# Patient Record
Sex: FEMALE | Race: WHITE | URBAN - METROPOLITAN AREA
[De-identification: names, ages, dates, MRNs, and addresses within clinical notes are randomized per-mention and may not be internally consistent; named-entity substitution may affect disease eponyms.]

---

## 2024-03-21 RX ORDER — ESCITALOPRAM OXALATE 20 MG/1
20 TABLET ORAL DAILY
COMMUNITY
Start: 2024-03-13

## 2024-03-21 RX ORDER — METFORMIN HYDROCHLORIDE 500 MG/1
TABLET, EXTENDED RELEASE ORAL
COMMUNITY
Start: 2023-12-10 | End: 2024-03-25

## 2024-03-21 RX ORDER — DOXYCYCLINE HYCLATE 100 MG
100 TABLET ORAL 2 TIMES DAILY
Status: ON HOLD | COMMUNITY
Start: 2023-12-06 | End: 2024-03-26

## 2024-03-25 ENCOUNTER — ANESTHESIA EVENT (OUTPATIENT)
Dept: SURGERY | Facility: HOSPITAL | Age: 34
End: 2024-03-25
Payer: COMMERCIAL

## 2024-03-26 ENCOUNTER — APPOINTMENT (OUTPATIENT)
Dept: ULTRASOUND IMAGING | Facility: HOSPITAL | Age: 34
End: 2024-03-26
Attending: STUDENT IN AN ORGANIZED HEALTH CARE EDUCATION/TRAINING PROGRAM
Payer: COMMERCIAL

## 2024-03-26 ENCOUNTER — ANESTHESIA (OUTPATIENT)
Dept: SURGERY | Facility: HOSPITAL | Age: 34
End: 2024-03-26
Payer: COMMERCIAL

## 2024-03-26 ENCOUNTER — HOSPITAL ENCOUNTER (OUTPATIENT)
Facility: HOSPITAL | Age: 34
Setting detail: HOSPITAL OUTPATIENT SURGERY
Discharge: HOME | End: 2024-03-26
Attending: STUDENT IN AN ORGANIZED HEALTH CARE EDUCATION/TRAINING PROGRAM | Admitting: STUDENT IN AN ORGANIZED HEALTH CARE EDUCATION/TRAINING PROGRAM
Payer: COMMERCIAL

## 2024-03-26 VITALS
BODY MASS INDEX: 29.25 KG/M2 | TEMPERATURE: 98 F | HEIGHT: 60 IN | OXYGEN SATURATION: 99 % | SYSTOLIC BLOOD PRESSURE: 122 MMHG | RESPIRATION RATE: 16 BRPM | DIASTOLIC BLOOD PRESSURE: 75 MMHG | WEIGHT: 149 LBS | HEART RATE: 64 BPM

## 2024-03-26 DIAGNOSIS — O02.1 MISSED AB (HCC): ICD-10-CM

## 2024-03-26 PROBLEM — O03.9 MISCARRIAGE (HCC): Status: ACTIVE | Noted: 2024-03-26

## 2024-03-26 PROBLEM — O03.9 MISCARRIAGE (HCC): Status: RESOLVED | Noted: 2024-03-26 | Resolved: 2024-03-26

## 2024-03-26 PROCEDURE — 76998 US GUIDE INTRAOP: CPT | Performed by: STUDENT IN AN ORGANIZED HEALTH CARE EDUCATION/TRAINING PROGRAM

## 2024-03-26 PROCEDURE — 10D17ZZ EXTRACTION OF PRODUCTS OF CONCEPTION, RETAINED, VIA NATURAL OR ARTIFICIAL OPENING: ICD-10-PCS | Performed by: STUDENT IN AN ORGANIZED HEALTH CARE EDUCATION/TRAINING PROGRAM

## 2024-03-26 PROCEDURE — 88305 TISSUE EXAM BY PATHOLOGIST: CPT | Performed by: STUDENT IN AN ORGANIZED HEALTH CARE EDUCATION/TRAINING PROGRAM

## 2024-03-26 PROCEDURE — 88262 CHROMOSOME ANALYSIS 15-20: CPT | Performed by: STUDENT IN AN ORGANIZED HEALTH CARE EDUCATION/TRAINING PROGRAM

## 2024-03-26 PROCEDURE — 88291 CYTO/MOLECULAR REPORT: CPT | Performed by: STUDENT IN AN ORGANIZED HEALTH CARE EDUCATION/TRAINING PROGRAM

## 2024-03-26 PROCEDURE — 88233 TISSUE CULTURE SKIN/BIOPSY: CPT | Performed by: STUDENT IN AN ORGANIZED HEALTH CARE EDUCATION/TRAINING PROGRAM

## 2024-03-26 RX ORDER — HYDROMORPHONE HYDROCHLORIDE 1 MG/ML
0.2 INJECTION, SOLUTION INTRAMUSCULAR; INTRAVENOUS; SUBCUTANEOUS EVERY 5 MIN PRN
Status: DISCONTINUED | OUTPATIENT
Start: 2024-03-26 | End: 2024-03-26

## 2024-03-26 RX ORDER — ACETAMINOPHEN 500 MG
1000 TABLET ORAL ONCE
Status: DISCONTINUED | OUTPATIENT
Start: 2024-03-26 | End: 2024-03-26

## 2024-03-26 RX ORDER — KETOROLAC TROMETHAMINE 30 MG/ML
INJECTION, SOLUTION INTRAMUSCULAR; INTRAVENOUS AS NEEDED
Status: DISCONTINUED | OUTPATIENT
Start: 2024-03-26 | End: 2024-03-26 | Stop reason: SURG

## 2024-03-26 RX ORDER — HYDROMORPHONE HYDROCHLORIDE 1 MG/ML
0.4 INJECTION, SOLUTION INTRAMUSCULAR; INTRAVENOUS; SUBCUTANEOUS EVERY 5 MIN PRN
Status: DISCONTINUED | OUTPATIENT
Start: 2024-03-26 | End: 2024-03-26

## 2024-03-26 RX ORDER — SODIUM CHLORIDE, SODIUM LACTATE, POTASSIUM CHLORIDE, CALCIUM CHLORIDE 600; 310; 30; 20 MG/100ML; MG/100ML; MG/100ML; MG/100ML
INJECTION, SOLUTION INTRAVENOUS CONTINUOUS
Status: DISCONTINUED | OUTPATIENT
Start: 2024-03-26 | End: 2024-03-26

## 2024-03-26 RX ORDER — ACETAMINOPHEN 500 MG
1000 TABLET ORAL ONCE AS NEEDED
Status: DISCONTINUED | OUTPATIENT
Start: 2024-03-26 | End: 2024-03-26

## 2024-03-26 RX ORDER — HYDROMORPHONE HYDROCHLORIDE 1 MG/ML
0.6 INJECTION, SOLUTION INTRAMUSCULAR; INTRAVENOUS; SUBCUTANEOUS EVERY 5 MIN PRN
Status: DISCONTINUED | OUTPATIENT
Start: 2024-03-26 | End: 2024-03-26

## 2024-03-26 RX ORDER — SCOLOPAMINE TRANSDERMAL SYSTEM 1 MG/1
1 PATCH, EXTENDED RELEASE TRANSDERMAL ONCE
Status: DISCONTINUED | OUTPATIENT
Start: 2024-03-26 | End: 2024-03-26

## 2024-03-26 RX ORDER — ONDANSETRON 2 MG/ML
4 INJECTION INTRAMUSCULAR; INTRAVENOUS EVERY 6 HOURS PRN
Status: DISCONTINUED | OUTPATIENT
Start: 2024-03-26 | End: 2024-03-26

## 2024-03-26 RX ORDER — LIDOCAINE HYDROCHLORIDE 10 MG/ML
INJECTION, SOLUTION EPIDURAL; INFILTRATION; INTRACAUDAL; PERINEURAL AS NEEDED
Status: DISCONTINUED | OUTPATIENT
Start: 2024-03-26 | End: 2024-03-26 | Stop reason: SURG

## 2024-03-26 RX ORDER — HYDROCODONE BITARTRATE AND ACETAMINOPHEN 5; 325 MG/1; MG/1
2 TABLET ORAL ONCE AS NEEDED
Status: DISCONTINUED | OUTPATIENT
Start: 2024-03-26 | End: 2024-03-26

## 2024-03-26 RX ORDER — HYDROCODONE BITARTRATE AND ACETAMINOPHEN 5; 325 MG/1; MG/1
1 TABLET ORAL ONCE AS NEEDED
Status: DISCONTINUED | OUTPATIENT
Start: 2024-03-26 | End: 2024-03-26

## 2024-03-26 RX ORDER — PROCHLORPERAZINE EDISYLATE 5 MG/ML
5 INJECTION INTRAMUSCULAR; INTRAVENOUS EVERY 8 HOURS PRN
Status: DISCONTINUED | OUTPATIENT
Start: 2024-03-26 | End: 2024-03-26

## 2024-03-26 RX ORDER — ONDANSETRON 2 MG/ML
INJECTION INTRAMUSCULAR; INTRAVENOUS AS NEEDED
Status: DISCONTINUED | OUTPATIENT
Start: 2024-03-26 | End: 2024-03-26 | Stop reason: SURG

## 2024-03-26 RX ORDER — NALOXONE HYDROCHLORIDE 0.4 MG/ML
0.08 INJECTION, SOLUTION INTRAMUSCULAR; INTRAVENOUS; SUBCUTANEOUS AS NEEDED
Status: DISCONTINUED | OUTPATIENT
Start: 2024-03-26 | End: 2024-03-26

## 2024-03-26 RX ADMIN — SODIUM CHLORIDE, SODIUM LACTATE, POTASSIUM CHLORIDE, CALCIUM CHLORIDE: 600; 310; 30; 20 INJECTION, SOLUTION INTRAVENOUS at 10:00:00

## 2024-03-26 RX ADMIN — SODIUM CHLORIDE, SODIUM LACTATE, POTASSIUM CHLORIDE, CALCIUM CHLORIDE: 600; 310; 30; 20 INJECTION, SOLUTION INTRAVENOUS at 09:25:00

## 2024-03-26 RX ADMIN — KETOROLAC TROMETHAMINE 30 MG: 30 INJECTION, SOLUTION INTRAMUSCULAR; INTRAVENOUS at 09:47:00

## 2024-03-26 RX ADMIN — LIDOCAINE HYDROCHLORIDE 5 ML: 10 INJECTION, SOLUTION EPIDURAL; INFILTRATION; INTRACAUDAL; PERINEURAL at 09:29:00

## 2024-03-26 RX ADMIN — ONDANSETRON 4 MG: 2 INJECTION INTRAMUSCULAR; INTRAVENOUS at 09:44:00

## 2024-03-26 NOTE — ANESTHESIA POSTPROCEDURE EVALUATION
[facility]    Lilli Baltazar Patient Status:  Hospital Outpatient Surgery   Age/Gender 33 year old female MRN [id number]   Location [facility] SURGERY Attending Barbara Pan MD   Hosp Day # 0 PCP No primary care provider on file.       Anesthesia Post-op Note    SUCTION DILATION AND CURETTAGE WITH ULTRASOUND GUIDANCE AND ULTRASOUND TECHNOLOGIST    Procedure Summary       Date: 03/26/24 Room / Location:  MAIN OR 02 / [facility] MAIN OR    Anesthesia Start: 0925 Anesthesia Stop: 1000    Procedure: SUCTION DILATION AND CURETTAGE WITH ULTRASOUND GUIDANCE AND ULTRASOUND TECHNOLOGIST (Uterus) Diagnosis: (MISSED AB)    Surgeons: Barbara Pan MD Anesthesiologist: Lily Bland MD    Anesthesia Type: MAC ASA Status: 2            Anesthesia Type: MAC    Vitals Value Taken Time   /75 03/26/24 1058   Temp 98 °F (36.7 °C) 03/26/24 0957   Pulse 66 03/26/24 1102   Resp 16 03/26/24 0957   SpO2 99 % 03/26/24 1102   Vitals shown include unfiled device data.    Patient Location: Same Day Surgery    Anesthesia Type: MAC    Airway Patency: patent    Postop Pain Control: adequate    Mental Status: preanesthetic baseline    Nausea/Vomiting: none    Cardiopulmonary/Hydration status: stable euvolemic    Complications: no apparent anesthesia related complications    Postop vital signs: stable    Dental Exam: Unchanged from Preop    Patient to be discharged from PACU when criteria met.

## 2024-03-26 NOTE — OPERATIVE REPORT
[facility]  Operative Report    Lilli Baltazar Patient Status:  [facility] Outpatient Surgery    1990 MRN [id number]   Location [facility] SURGERY Attending Barbara Pan MD    Day # 0 PCP No primary care provider on file.       Procedure: suction dilation and curettage    Date of procedure: 24    Pre op diagnosis: early pregnancy loss    Post op diagnosis: early pregnancy loss    Surgeon: Barbara Pan MD    Findings:   1. Normal external genitalia  2. Cervix dilated to 8 mm    Anesthesia: MAC    EBL: 15 mL    Antibiotics: Doxycycline Hyclate 200mg IV     Urine output: 250 mL clear urine    Specimen: products of conception    Procedure details:  The patient was transferred to the operating room where anesthesia was initiated.     The patient was prepped and draped in the normal sterile fashion in dorsal lithotomy position. A catheter was used to evacuate the bladder of 250 mL of clear urine. A sterile speculum was placed in the vagina, and the cervix was visualized. A single tooth tenaculum was placed on the anterior lip of the cervix. Bedside ultrasound showed an intrauterine sac.     Hegar dilators were used to dilate to 8 mm under visualization from the ultrasound. A suction curette was advanced and rotated in a circular fashion, obtaining a moderate amount of tissue to be sent to pathology. The curette was passed two times.    Sharp curettage was then performed with additional tissue to be sent to pathology. The suction curette was then passed two final times to clear the uterus of any remaining products of conception. The lining appeared thin on ultrasound with low suspicion for retained tissue.    The tenaculum was removed from the anterior lip of the cervix. Pressure was held with a sponge stick until hemostasis was noted. The speculum was removed.    All sponge, lap and instrument counts were correct x2. The patient tolerated the procedure well and was taken to the recovery  room in stable condition.     Barbara Pan MD

## 2024-03-26 NOTE — ANESTHESIA PREPROCEDURE EVALUATION
PRE-OP EVALUATION    Patient Name: Lilli Baltazar    Admit Diagnosis: MISSED AB    Pre-op Diagnosis: MISSED AB    SUCTION DILATION AND CURETTAGE WITH ULTRASOUND GUIDANCE AND ULTRASOUND TECHNOLOGIST    Anesthesia Procedure: SUCTION DILATION AND CURETTAGE WITH ULTRASOUND GUIDANCE AND ULTRASOUND TECHNOLOGIST    Surgeon(s) and Role:     * aBrbara Pan MD - Primary    Pre-op vitals reviewed.        Body mass index is 27.93 kg/m².    Current medications reviewed.  Hospital Medications:  No current facility-administered medications on file as of .       Outpatient Medications:     No medications prior to admission.       Allergies: Penicillins      Anesthesia Evaluation    Patient summary reviewed.    Anesthetic Complications  (+) history of anesthetic complications  History of: PONV       GI/Hepatic/Renal    Negative GI/hepatic/renal ROS.                             Cardiovascular    Negative cardiovascular ROS.    Exercise tolerance: good     MET: >4    (+) obesity                         (-) angina     (-) SALAZAR         Endo/Other  Comment: PCOS  Negative endo/other ROS.  (-) diabetes     (-) hypothyroidism  (-) hyperthyroidism                     Pulmonary    Negative pulmonary ROS.                       Neuro/Psych        (+) anxiety                              Past Surgical History:   Procedure Laterality Date    HC SURGERY CATHETER EPS DX/ABLATION OTHER THAN 3D       UTERINE POLYP REMOVED      OTHER SURGICAL HISTORY      ELECTIVE  -MIDDLE SCHOOL    OTHER SURGICAL HISTORY      WISDOM TEETH  SURGERY     Social History     Tobacco Use    Smoking status: Never    Smokeless tobacco: Never   Vaping Use    Vaping Use: Former   Substance and Sexual Activity    Alcohol use: Not Currently    Drug use: Never     History   Drug Use Unknown     Available pre-op labs reviewed.               Airway      Mallampati: II  Mouth opening: >3 FB  TM distance: > 6 cm  Neck ROM: full Cardiovascular    Cardiovascular  exam normal.         Dental             Pulmonary    Pulmonary exam normal.                 Other findings              ASA: 2   Plan: MAC  NPO status verified and Patient does not meet NPO guidelines.  Patient has not taken beta blockers in last 24 hours.  Post-procedure pain management plan discussed with surgeon and patient.    Comment:     A detailed discussion about the anesthetic plan was held with Lilli Baltazar in the preoperative area. Benefits and risks of MAC anesthesia were discussed, including intraoperative awareness/recall, PONV, reasonable expectations of post-operative pain/discomfort, aspiration, conversion to general anesthesia, dental injury, pressure/nerve injuries from positioning, and other serious but rare complications (life-threatening cardiopulmonary events). All questions were answered appropriately and patient demonstrated understanding of realistic expectations and risks of undergoing anesthesia. Lilli Baltazar consents to receiving anesthesia and wishes to proceed.     Plan/risks discussed with: patient              Present on Admission:  **None**

## 2024-03-26 NOTE — DISCHARGE INSTRUCTIONS
Suction Curettage (Therapeutic Dilation & Curettage, D&C)   Suction curettage is a procedure to remove the lining and contents of the womb (uterus). It may be done to stop bleeding, control pain, and prevent infection after a miscarriage, , or childbirth. It may also be done to remove a molar pregnancy. This is when tumors grow in the womb instead of or in addition to a fetus.   After the procedure, you should be able to return to your normal routine in 1 or 2 days. But you may have some cramping and light bleeding. This is normal. These problems should go away within 5 to 7 days. You can expect to have your next period within 4 to 6 weeks.   Home care  If you have pain or cramping, use pain medicine as directed.  If you have light bleeding, use pads instead of tampons. Change these as often as needed.  Don't douche, use tampons, or have sex until your healthcare provider says it’s OK.  Take showers instead of baths for 1 to 2 weeks.    Follow-up care  Follow-up with your healthcare provider as directed.  When to seek medical advice  Call your healthcare provider right away if any of these occur:  Fever of 100.4ºF (38ºC) or higher, or as directed by your healthcare provider  Heavy bleeding  Bleeding that lasts longer than 1 week  Pain or cramping worsens instead of getting better  Foul-smelling discharge from the vagina  Passage of anything that resembles tissue from the vagina. If possible, save the tissue and bring it to the healthcare provider.  Weakness, dizziness, or fainting  [employer] last reviewed this educational content on 2022 [employer]. All rights reserved. This information is not intended as a substitute for professional medical care. Always follow your healthcare professional's instructions.

## 2024-03-26 NOTE — H&P
[facility]  Gynecology History & Physical      Lilli Baltazar Patient Status:  Hospital Outpatient Surgery    1990 MRN [id number]   Location [facility] PERIOPERATIVE SERVICE Attending Barbara Pan MD   Hosp Day # 0 PCP No primary care provider on file.       Subjective:     Chief Complaint: miscarriage, bleeding    HPI  Patient is a 33 year old  female here for scheduled suction D&C for early pregnancy loss. Ultrasound on 24 at 7w5d by LMP showed an intrauterine sac measuring 6w5d with a possible small, irregularly shaped yolk sac. A fetal pole was not visualized. Follow up imaging on 3/13/24 at 9w5d by LMP showed the gestational sac measuring 6w6d without a fetal pole, confirming early pregnancy loss. Expectant, medical, and surgical management were discussed. The patient elected surgical management. The patient reports bleeding since 3/22/24. Ultrasound yesterday showed the gestational sac has not passed.     Gynecological History:  Menses: every 28-30 days lasting 4-5 days.   Sexually transmitted diseases: no past history  Contraception: none  Abnormal paps: none  Last pap: normal Date: 2024      Obstetric History:  OB History    Para Term  AB Living   2       1     SAB IAB Ectopic Multiple Live Births     1            # Outcome Date GA Lbr Shekhar/2nd Weight Sex Delivery Anes PTL Lv   2 Current            1 IAB                Meds:  Medications Prior to Admission   Medication Sig Dispense Refill Last Dose    escitalopram 20 MG Oral Tab Take 1 tablet (20 mg total) by mouth daily.   3/25/2024    metFORMIN 500 MG Oral Tab    Past Month       Allergies:  Penicillins    Past Medical History:  Past Medical History:   Diagnosis Date    Anxiety state     Endometrial polyp 10/2023    History of smoking 2007    stopped in  for pregnancy    PCOS (polycystic ovarian syndrome)     PONV (postoperative nausea and vomiting)        Past Surgical History:  Past Surgical History:    Procedure Laterality Date    HYSTEROSCOPY  2023    UTERINE POLYP REMOVED      OTHER SURGICAL HISTORY      ELECTIVE  -MIDDLE SCHOOL    OTHER SURGICAL HISTORY      WISDOM TEETH  SURGERY       Family History:  Family History   Problem Relation Age of Onset    Breast Cancer Maternal Aunt        Social History:  Social History     Socioeconomic History    Marital status:      Spouse name: Not on file    Number of children: Not on file    Years of education: Not on file    Highest education level: Not on file   Occupational History    Not on file   Tobacco Use    Smoking status: Never    Smokeless tobacco: Never   Vaping Use    Vaping Use: Former   Substance and Sexual Activity    Alcohol use: Not Currently    Drug use: Never    Sexual activity: Not on file   Other Topics Concern    Not on file   Social History Narrative    Not on file     Social Determinants of Health     Financial Resource Strain: Not on file   Food Insecurity: Not on file   Transportation Needs: Not on file   Stress: Not on file   Housing Stability: Not on file       Review of Systems  Pertinent items are noted in HPI.     Objective:     /74 (BP Location: Left arm)   Pulse 75   Temp 98.1 °F (36.7 °C) (Temporal)   Resp 16   Ht 5' (1.524 m)   Wt 149 lb (67.6 kg)   LMP 2024   SpO2 99%   Breastfeeding No   BMI 29.10 kg/m²     General:   alert, appears stated age, and cooperative   Abdomen:  Soft, nontender ,nondistended   Extremities: No edema. No calf tenderness.     Labs:            12.4  7 >-----------< 313           38.4      IMAGING: see HPI    Assessment:      Pt is 33 year old  female with an early pregnancy loss who presents for surgical management.    Plan:     Planned Procedure: suction dilation and curettage with ultrasound guidance    Barbara Pan MD  3/26/2024

## 2024-04-05 LAB
CELLS ANALYZED CHRPOC: 20
CELLS COUNTED CHRPOC: 20
CELLS KARYOTYPED CHRPOC: 2
GTG BAND RES ACHIEVED CHRPOC: 450

## 2024-04-09 ENCOUNTER — TELEPHONE (OUTPATIENT)
Dept: OBGYN UNIT | Facility: HOSPITAL | Age: 34
End: 2024-04-09

## 2024-06-24 LAB
ANTIBODY SCREEN OB: NEGATIVE
HEPATITIS B SURFACE ANTIGEN OB: NEGATIVE
HIV RESULT OB: NEGATIVE
RAPID PLASMA REAGIN OB: NONREACTIVE
RH FACTOR OB: POSITIVE

## 2024-10-10 ENCOUNTER — HOSPITAL ENCOUNTER (OUTPATIENT)
Facility: HOSPITAL | Age: 34
Discharge: HOME | End: 2024-10-10
Attending: OBSTETRICS & GYNECOLOGY | Admitting: OBSTETRICS & GYNECOLOGY
Payer: COMMERCIAL

## 2024-10-10 VITALS
SYSTOLIC BLOOD PRESSURE: 123 MMHG | RESPIRATION RATE: 18 BRPM | HEIGHT: 60 IN | WEIGHT: 165 LBS | HEART RATE: 115 BPM | DIASTOLIC BLOOD PRESSURE: 77 MMHG | BODY MASS INDEX: 32.39 KG/M2 | TEMPERATURE: 98 F

## 2024-10-10 PROCEDURE — 99212 OFFICE O/P EST SF 10 MIN: CPT

## 2024-10-10 RX ORDER — CHOLECALCIFEROL (VITAMIN D3) 25 MCG
1 TABLET,CHEWABLE ORAL DAILY
COMMUNITY

## 2024-10-10 RX ORDER — ASPIRIN 81 MG/1
81 TABLET ORAL DAILY
COMMUNITY

## 2024-10-10 NOTE — PROGRESS NOTES
Pt is a 34 year old female admitted to TRG2/TRG2-A.     Chief Complaint   Patient presents with    Decreased Fetal Movement     Pt has not felt regular fetal movement since Monday 10/7/24, only felt 1 small movement today around 10am      Pt is  Unknown intra-uterine pregnancy.  History obtained, consents signed. Oriented to room, staff, and plan of care.

## 2024-10-10 NOTE — PROGRESS NOTES
Discharge instructions and kick counts discussed w/pt, no questions at this time.  Pt left unit in stable condition, ambulatory, and with all personal belongings.

## 2024-10-10 NOTE — NST
Nonstress Test   Patient: Lilli Baltazar    Gestation: 27w0d    NST:       Variability: Moderate           Accelerations: Yes           Decelerations: None            Baseline: 140 BPM           Uterine Irritability: No           Contractions: Not present                                        Acoustic Stimulator: No           Nonstress Test Interpretation: Appropriate for gestational age           Nonstress Test Second Interpretation: Appropriate for gestational age                     Additional Comments

## 2024-10-10 NOTE — DISCHARGE INSTRUCTIONS
Discharge Instructions    Diet: Regular  Activity: Normal activity         General Instructions    Call your OB doctor if: Fluid leaking from your vagina;Uterine contractions increasing in intensity and frequency;Vaginal bleeding;Vaginal or rectal pressure;Temperature greater than 100F;Decrease in fetal movement;Uterine contractions 10 minutes or closer for 1 to 2 hours

## 2024-10-26 LAB — HIV RESULT OB: NEGATIVE

## 2024-10-31 ENCOUNTER — TELEPHONE (OUTPATIENT)
Dept: HEMATOLOGY/ONCOLOGY | Facility: HOSPITAL | Age: 34
End: 2024-10-31

## 2024-10-31 NOTE — TELEPHONE ENCOUNTER
Patient calling to make heme consult for low iron anemia. [facility] to [facility]. Thank you jimbo

## 2024-11-14 ENCOUNTER — TELEPHONE (OUTPATIENT)
Dept: HEMATOLOGY/ONCOLOGY | Facility: HOSPITAL | Age: 34
End: 2024-11-14

## 2024-11-14 ENCOUNTER — APPOINTMENT (OUTPATIENT)
Dept: HEMATOLOGY/ONCOLOGY | Facility: HOSPITAL | Age: 34
End: 2024-11-14
Attending: INTERNAL MEDICINE
Payer: COMMERCIAL

## 2024-11-14 PROBLEM — D50.8 IRON DEFICIENCY ANEMIA SECONDARY TO INADEQUATE DIETARY IRON INTAKE: Status: ACTIVE | Noted: 2024-11-14

## 2024-11-15 ENCOUNTER — TELEPHONE (OUTPATIENT)
Dept: HEMATOLOGY/ONCOLOGY | Facility: HOSPITAL | Age: 34
End: 2024-11-15

## 2024-11-15 NOTE — TELEPHONE ENCOUNTER
----- Message from Jazzmine ESQUIVEL sent at 11/15/2024  8:13 AM CST -----  Hi- can you please call to reschedule her consult and iron infusion from this Monday to the next week on Monday? We are waiting for insurance approval for her iron.    Thanks so much!

## 2024-11-18 ENCOUNTER — APPOINTMENT (OUTPATIENT)
Dept: HEMATOLOGY/ONCOLOGY | Facility: HOSPITAL | Age: 34
End: 2024-11-18
Attending: INTERNAL MEDICINE
Payer: COMMERCIAL

## 2024-11-19 ENCOUNTER — TELEPHONE (OUTPATIENT)
Dept: HEMATOLOGY/ONCOLOGY | Facility: HOSPITAL | Age: 34
End: 2024-11-19

## 2024-11-26 ENCOUNTER — OFFICE VISIT (OUTPATIENT)
Dept: HEMATOLOGY/ONCOLOGY | Facility: HOSPITAL | Age: 34
End: 2024-11-26
Attending: INTERNAL MEDICINE
Payer: COMMERCIAL

## 2024-11-26 VITALS
SYSTOLIC BLOOD PRESSURE: 143 MMHG | TEMPERATURE: 97 F | DIASTOLIC BLOOD PRESSURE: 70 MMHG | OXYGEN SATURATION: 98 % | RESPIRATION RATE: 20 BRPM | BODY MASS INDEX: 35 KG/M2 | WEIGHT: 179.81 LBS | HEART RATE: 108 BPM

## 2024-11-26 VITALS — SYSTOLIC BLOOD PRESSURE: 116 MMHG | DIASTOLIC BLOOD PRESSURE: 77 MMHG | HEART RATE: 96 BPM

## 2024-11-26 DIAGNOSIS — D50.8 IRON DEFICIENCY ANEMIA SECONDARY TO INADEQUATE DIETARY IRON INTAKE: Primary | ICD-10-CM

## 2024-11-26 DIAGNOSIS — O99.013 ANEMIA DURING PREGNANCY IN THIRD TRIMESTER (HCC): ICD-10-CM

## 2024-11-26 PROCEDURE — G2211 COMPLEX E/M VISIT ADD ON: HCPCS | Performed by: INTERNAL MEDICINE

## 2024-11-26 PROCEDURE — 96365 THER/PROPH/DIAG IV INF INIT: CPT

## 2024-11-26 PROCEDURE — 99205 OFFICE O/P NEW HI 60 MIN: CPT | Performed by: INTERNAL MEDICINE

## 2024-11-26 NOTE — PROGRESS NOTES
Hematology/Oncology Initial Consultation Note    Patient Name: Lilli Baltazar  Medical Record Number: [id number]    YOB: 1990   Date of Consultation: 2024   PCP: No primary care provider on file.  Other providers:  Dr aPn    Reason for Consultation:  Lilli Baltazar was seen today for the diagnosis of iron deficiency anemia    History of Present Illness:      33 y/o 33 week pregnant F presenting for iron deficiency anemia in pregnancy.    - noted to be iron deficient back in 2024 with ferritin of 11, hgb 11.9 g/dL  - recent labs on 10/26 with ferritin of 4.8, hgb 9.2g/dL  - having ongoing fatigue  - she is planning for   - she plans to breast feed  - does not feel like her periods before pregnancy were particularly heavy  - no blood in urine or stool  - having a boy      Past Medical History:  Past Medical History:    Anxiety state    Endometrial polyp    History of smoking    stopped in  for pregnancy    PCOS (polycystic ovarian syndrome)    PONV (postoperative nausea and vomiting)       Past Surgical History:   Procedure Laterality Date    Hysteroscopy  2023    UTERINE POLYP REMOVED      Other surgical history      ELECTIVE  -MIDDLE SCHOOL    Other surgical history      WISDOM TEETH  SURGERY       Home Medications:  No outpatient medications have been marked as taking for the 24 encounter (Office Visit) with Daniel Barton MD.     -------  Medications Ordered Prior to Encounter[1]    Allergies:   Allergies[2]    Psychosocial History:  Social History     Social History Narrative    Not on file     Social History     Socioeconomic History    Marital status:    Tobacco Use    Smoking status: Never    Smokeless tobacco: Never   Vaping Use    Vaping status: Former   Substance and Sexual Activity    Alcohol use: Not Currently    Drug use: Never     Social Drivers of Health     Financial Resource Strain: Low Risk  (10/29/2024)    Financial Resource  Strain     Difficulty of Paying Living Expenses: Not very hard     Med Affordability: No   Food Insecurity: No Food Insecurity (10/29/2024)    Food Insecurity     Food Insecurity: Never true   Transportation Needs: No Transportation Needs (10/29/2024)    Transportation Needs     Lack of Transportation: No   Stress: No Stress Concern Present (10/29/2024)    Stress     Feeling of Stress : No   Housing Stability: Low Risk  (10/29/2024)    Housing Stability     Housing Instability: No       Family Medical History:  Family History   Problem Relation Age of Onset    Breast Cancer Maternal Aunt        Review of Systems:  A 10-point ROS was done with pertinent positives and negative per the HPI    Vital Signs:  Height: --  Weight: 81.6 kg (179 lb 12.8 oz) (11/26 1132)  BSA (Calculated - sq m): --  Pulse: 108 (11/26 1132)  BP: 143/70 (11/26 1132)  Temp: 96.5 °F (35.8 °C) (11/26 1132)  Do Not Use - Resp Rate: --  SpO2: 98 % (11/26 1132)    Wt Readings from Last 6 Encounters:   11/26/24 81.6 kg (179 lb 12.8 oz)   10/10/24 74.8 kg (165 lb)   03/26/24 67.6 kg (149 lb)       Physical Examination:  General: Patient is alert and oriented, not in acute distress  Psych: Mood and affect are appropriate  Eyes: EOMI, PERRL  ENT: Oropharynx is clear, no adenopathy  CV: no LE edema  Respiratory: Non-labored respirations  GI/Abd: +pregnant  Neurological: Grossly intact   Skin: no rashes or petechiae    Laboratory:  No results found for: \"WBC\", \"HGB\", \"HCT\", \"MCV\", \"MCH\", \"MCHC\", \"RDW\", \"PLT\"  No results found for: \"GLU\", \"BUN\", \"BUNCREA\", \"CREATSERUM\", \"ANIONGAP\", \"GFR\", \"GFRNAA\", \"GFRAA\", \"CA\", \"OSMOCALC\", \"ALKPHO\", \"AST\", \"ALT\", \"ALKPHOS\", \"BILT\", \"TP\", \"ALB\", \"GLOBULIN\", \"AGRATIO\", \"NA\", \"K\", \"CL\", \"CO2\"  No results found for: \"PTT\", \"PT\", \"INR\"    Impression & Plan:     Iron deficiency anemia in pregnancy  - iron studies consistent with iron deficiency  - schedule 1000mg IV iron dextran today, close monitoring in infusion for  reaction  - repeat CBC, iron, tibc, ferritin in 3 months to reassess iron stores; to draw 2 weeks after her delivery given she plans to breastfeed    Follow up in future for more IV iron if iron deficient in future    Daniel Barton MD  Hematology/Medical Oncology  [facility]           [1]   Current Outpatient Medications on File Prior to Visit   Medication Sig Dispense Refill    prenatal vitamin with DHA 27-0.8-228 MG Oral Cap Take 1 capsule by mouth daily.      aspirin 81 MG Oral Tab EC Take 1 tablet (81 mg total) by mouth daily.      escitalopram 20 MG Oral Tab Take 1 tablet (20 mg total) by mouth daily.       No current facility-administered medications on file prior to visit.   [2]   Allergies  Allergen Reactions    Penicillins OTHER (SEE COMMENTS)      A CHILD

## 2024-11-26 NOTE — PROGRESS NOTES
Education Record    Learner:  Patient and Spouse    Disease / Diagnosis: Iron Deficiency Anemia in pregnancy    Barriers / Limitations:  None   Comments:    Method:  Brief focused and Discussion   Comments:    General Topics:  Medication, Side effects and symptom management, and Plan of care reviewed   Comments:    Outcome:  Shows understanding   Comments:    Infed test dose given without incident. Observed for 15 minutes.  Infed given and tolerated well.  Post vitals stable. Pt to have labs 2 weeks after delivery. Pt states due date is 1/9/25. Labs scheduled for 1/23/25 1:50 pm. Lab orders in. Discharged home in stable condition, no new complaints.

## 2024-11-26 NOTE — PROGRESS NOTES
Education Record    Learner:  Patient and Spouse    Disease / Diagnosis: Iron deficiency in pregnancy    Barriers / Limitations:  None   Comments:    Method:  Discussion   Comments:    General Topics:  Medication, Side effects and symptom management, and Plan of care reviewed   Comments:    Outcome:  Shows understanding   Comments:    Here for new consult. Feeling very fatigued, shortness of breath, and craving ice. Due 1/9/25.

## 2024-12-13 LAB — STREP GP B CULT OB: POSITIVE

## 2024-12-26 ENCOUNTER — TELEPHONE (OUTPATIENT)
Dept: OBGYN UNIT | Facility: HOSPITAL | Age: 34
End: 2024-12-26

## 2024-12-31 ENCOUNTER — HOSPITAL ENCOUNTER (INPATIENT)
Facility: HOSPITAL | Age: 34
LOS: 5 days | Discharge: HOME | End: 2025-01-05
Attending: OBSTETRICS & GYNECOLOGY | Admitting: OBSTETRICS & GYNECOLOGY
Payer: COMMERCIAL

## 2024-12-31 ENCOUNTER — APPOINTMENT (OUTPATIENT)
Dept: OBGYN CLINIC | Facility: HOSPITAL | Age: 34
End: 2024-12-31
Payer: COMMERCIAL

## 2024-12-31 PROBLEM — Z34.90 PREGNANCY (HCC): Status: ACTIVE | Noted: 2024-12-31

## 2024-12-31 LAB
ALBUMIN SERPL-MCNC: 3.9 G/DL (ref 3.2–4.8)
ALBUMIN/GLOB SERPL: 1.6 {RATIO} (ref 1–2)
ALP LIVER SERPL-CCNC: 126 U/L
ALT SERPL-CCNC: 9 U/L
ANION GAP SERPL CALC-SCNC: 10 MMOL/L (ref 0–18)
ANTIBODY SCREEN: NEGATIVE
AST SERPL-CCNC: 17 U/L (ref ?–34)
BASOPHILS # BLD AUTO: 0.03 X10(3) UL (ref 0–0.2)
BASOPHILS NFR BLD AUTO: 0.3 %
BILIRUB SERPL-MCNC: 0.3 MG/DL (ref 0.3–1.2)
BUN BLD-MCNC: 5 MG/DL (ref 9–23)
CALCIUM BLD-MCNC: 9.9 MG/DL (ref 8.7–10.4)
CHLORIDE SERPL-SCNC: 105 MMOL/L (ref 98–112)
CO2 SERPL-SCNC: 23 MMOL/L (ref 21–32)
CREAT BLD-MCNC: 0.58 MG/DL
CREAT UR-SCNC: 21.9 MG/DL
EGFRCR SERPLBLD CKD-EPI 2021: 123 ML/MIN/1.73M2 (ref 60–?)
EOSINOPHIL # BLD AUTO: 0.11 X10(3) UL (ref 0–0.7)
EOSINOPHIL NFR BLD AUTO: 1.1 %
ERYTHROCYTE [DISTWIDTH] IN BLOOD BY AUTOMATED COUNT: 19.9 %
GLOBULIN PLAS-MCNC: 2.4 G/DL (ref 2–3.5)
GLUCOSE BLD-MCNC: 77 MG/DL (ref 70–99)
HCT VFR BLD AUTO: 33.3 %
HGB BLD-MCNC: 11 G/DL
IMM GRANULOCYTES # BLD AUTO: 0.08 X10(3) UL (ref 0–1)
IMM GRANULOCYTES NFR BLD: 0.8 %
LYMPHOCYTES # BLD AUTO: 1.39 X10(3) UL (ref 1–4)
LYMPHOCYTES NFR BLD AUTO: 13.3 %
MCH RBC QN AUTO: 28.2 PG (ref 26–34)
MCHC RBC AUTO-ENTMCNC: 33 G/DL (ref 31–37)
MCV RBC AUTO: 85.4 FL
MONOCYTES # BLD AUTO: 0.73 X10(3) UL (ref 0.1–1)
MONOCYTES NFR BLD AUTO: 7 %
NEUTROPHILS # BLD AUTO: 8.11 X10 (3) UL (ref 1.5–7.7)
NEUTROPHILS # BLD AUTO: 8.11 X10(3) UL (ref 1.5–7.7)
NEUTROPHILS NFR BLD AUTO: 77.5 %
OSMOLALITY SERPL CALC.SUM OF ELEC: 282 MOSM/KG (ref 275–295)
PLATELET # BLD AUTO: 204 10(3)UL (ref 150–450)
POTASSIUM SERPL-SCNC: 3.2 MMOL/L (ref 3.5–5.1)
PROT SERPL-MCNC: 6.3 G/DL (ref 5.7–8.2)
PROT UR-MCNC: <6 MG/DL (ref ?–14)
RBC # BLD AUTO: 3.9 X10(6)UL
RH BLOOD TYPE: POSITIVE
RH BLOOD TYPE: POSITIVE
SODIUM SERPL-SCNC: 138 MMOL/L (ref 136–145)
T PALLIDUM AB SER QL IA: NONREACTIVE
WBC # BLD AUTO: 10.5 X10(3) UL (ref 4–11)

## 2024-12-31 PROCEDURE — 3E0DXGC INTRODUCTION OF OTHER THERAPEUTIC SUBSTANCE INTO MOUTH AND PHARYNX, EXTERNAL APPROACH: ICD-10-PCS | Performed by: OBSTETRICS & GYNECOLOGY

## 2024-12-31 RX ORDER — ACETAMINOPHEN 500 MG
1000 TABLET ORAL EVERY 6 HOURS PRN
Status: DISCONTINUED | OUTPATIENT
Start: 2024-12-31 | End: 2025-01-02

## 2024-12-31 RX ORDER — IBUPROFEN 600 MG/1
600 TABLET, FILM COATED ORAL ONCE AS NEEDED
Status: DISCONTINUED | OUTPATIENT
Start: 2024-12-31 | End: 2025-01-02

## 2024-12-31 RX ORDER — TERBUTALINE SULFATE 1 MG/ML
0.25 INJECTION, SOLUTION SUBCUTANEOUS AS NEEDED
Status: DISCONTINUED | OUTPATIENT
Start: 2024-12-31 | End: 2025-01-02

## 2024-12-31 RX ORDER — DEXTROSE, SODIUM CHLORIDE, SODIUM LACTATE, POTASSIUM CHLORIDE, AND CALCIUM CHLORIDE 5; .6; .31; .03; .02 G/100ML; G/100ML; G/100ML; G/100ML; G/100ML
INJECTION, SOLUTION INTRAVENOUS AS NEEDED
Status: DISCONTINUED | OUTPATIENT
Start: 2024-12-31 | End: 2025-01-02

## 2024-12-31 RX ORDER — CALCIUM CARBONATE 500 MG/1
1000 TABLET, CHEWABLE ORAL EVERY 4 HOURS PRN
Status: DISCONTINUED | OUTPATIENT
Start: 2024-12-31 | End: 2025-01-02

## 2024-12-31 RX ORDER — CITRIC ACID/SODIUM CITRATE 334-500MG
30 SOLUTION, ORAL ORAL AS NEEDED
Status: COMPLETED | OUTPATIENT
Start: 2024-12-31 | End: 2025-01-02

## 2024-12-31 RX ORDER — ACETAMINOPHEN 500 MG
500 TABLET ORAL EVERY 6 HOURS PRN
Status: DISCONTINUED | OUTPATIENT
Start: 2024-12-31 | End: 2025-01-02

## 2024-12-31 RX ORDER — ONDANSETRON 2 MG/ML
4 INJECTION INTRAMUSCULAR; INTRAVENOUS EVERY 6 HOURS PRN
Status: DISCONTINUED | OUTPATIENT
Start: 2024-12-31 | End: 2025-01-02

## 2024-12-31 RX ORDER — SODIUM CHLORIDE, SODIUM LACTATE, POTASSIUM CHLORIDE, CALCIUM CHLORIDE 600; 310; 30; 20 MG/100ML; MG/100ML; MG/100ML; MG/100ML
INJECTION, SOLUTION INTRAVENOUS CONTINUOUS
Status: DISCONTINUED | OUTPATIENT
Start: 2024-12-31 | End: 2025-01-02

## 2024-12-31 RX ORDER — CLINDAMYCIN PHOSPHATE 900 MG/50ML
900 INJECTION, SOLUTION INTRAVENOUS EVERY 8 HOURS
Status: DISCONTINUED | OUTPATIENT
Start: 2025-01-01 | End: 2025-01-02

## 2025-01-01 ENCOUNTER — ANESTHESIA EVENT (OUTPATIENT)
Dept: OBGYN UNIT | Facility: HOSPITAL | Age: 35
End: 2025-01-01
Payer: COMMERCIAL

## 2025-01-01 ENCOUNTER — ANESTHESIA (OUTPATIENT)
Dept: OBGYN UNIT | Facility: HOSPITAL | Age: 35
End: 2025-01-01
Payer: COMMERCIAL

## 2025-01-01 LAB — POTASSIUM SERPL-SCNC: 3.4 MMOL/L (ref 3.5–5.1)

## 2025-01-01 PROCEDURE — 3E033VJ INTRODUCTION OF OTHER HORMONE INTO PERIPHERAL VEIN, PERCUTANEOUS APPROACH: ICD-10-PCS | Performed by: OBSTETRICS & GYNECOLOGY

## 2025-01-01 PROCEDURE — 3E0P7VZ INTRODUCTION OF HORMONE INTO FEMALE REPRODUCTIVE, VIA NATURAL OR ARTIFICIAL OPENING: ICD-10-PCS | Performed by: OBSTETRICS & GYNECOLOGY

## 2025-01-01 RX ORDER — NALBUPHINE HYDROCHLORIDE 10 MG/ML
2.5 INJECTION INTRAMUSCULAR; INTRAVENOUS; SUBCUTANEOUS
Status: DISCONTINUED | OUTPATIENT
Start: 2025-01-01 | End: 2025-01-02

## 2025-01-01 RX ORDER — LIDOCAINE HYDROCHLORIDE 20 MG/ML
5 INJECTION, SOLUTION EPIDURAL; INFILTRATION; INTRACAUDAL; PERINEURAL AS NEEDED
Status: DISCONTINUED | OUTPATIENT
Start: 2025-01-01 | End: 2025-01-02

## 2025-01-01 RX ORDER — POTASSIUM CHLORIDE 1.5 G/1.58G
40 POWDER, FOR SOLUTION ORAL ONCE
Status: COMPLETED | OUTPATIENT
Start: 2025-01-01 | End: 2025-01-01

## 2025-01-01 RX ORDER — SODIUM CHLORIDE 9 MG/ML
10 INJECTION, SOLUTION INTRAMUSCULAR; INTRAVENOUS; SUBCUTANEOUS AS NEEDED
Status: DISCONTINUED | OUTPATIENT
Start: 2025-01-01 | End: 2025-01-02

## 2025-01-01 RX ORDER — BUPIVACAINE HYDROCHLORIDE 2.5 MG/ML
30 INJECTION, SOLUTION EPIDURAL; INFILTRATION; INTRACAUDAL AS NEEDED
Status: DISCONTINUED | OUTPATIENT
Start: 2025-01-01 | End: 2025-01-02

## 2025-01-01 RX ORDER — LIDOCAINE HYDROCHLORIDE AND EPINEPHRINE 15; 5 MG/ML; UG/ML
5 INJECTION, SOLUTION EPIDURAL AS NEEDED
Status: DISCONTINUED | OUTPATIENT
Start: 2025-01-01 | End: 2025-01-02

## 2025-01-01 RX ORDER — SODIUM CHLORIDE 9 MG/ML
INJECTION, SOLUTION INTRAMUSCULAR; INTRAVENOUS; SUBCUTANEOUS
Status: DISCONTINUED
Start: 2025-01-01 | End: 2025-01-02 | Stop reason: WASHOUT

## 2025-01-01 RX ORDER — BUPIVACAINE HCL/0.9 % NACL/PF 0.25 %
5 PLASTIC BAG, INJECTION (ML) EPIDURAL AS NEEDED
Status: DISCONTINUED | OUTPATIENT
Start: 2025-01-01 | End: 2025-01-02

## 2025-01-01 RX ORDER — POTASSIUM CHLORIDE 1500 MG/1
40 TABLET, EXTENDED RELEASE ORAL ONCE
Status: COMPLETED | OUTPATIENT
Start: 2025-01-01 | End: 2025-01-01

## 2025-01-01 RX ORDER — LIDOCAINE HYDROCHLORIDE AND EPINEPHRINE 15; 5 MG/ML; UG/ML
INJECTION, SOLUTION EPIDURAL AS NEEDED
Status: DISCONTINUED | OUTPATIENT
Start: 2025-01-01 | End: 2025-01-02 | Stop reason: SURG

## 2025-01-01 RX ORDER — BUPIVACAINE HCL/0.9 % NACL/PF 0.25 %
PLASTIC BAG, INJECTION (ML) EPIDURAL
Status: DISCONTINUED
Start: 2025-01-01 | End: 2025-01-02 | Stop reason: WASHOUT

## 2025-01-01 RX ADMIN — LIDOCAINE HYDROCHLORIDE AND EPINEPHRINE 3 ML: 15; 5 INJECTION, SOLUTION EPIDURAL at 14:22:00

## 2025-01-01 NOTE — ANESTHESIA PROCEDURE NOTES
Labor Analgesia    Date/Time: 1/1/2025 2:06 PM    Performed by: Shorty Arriola DO  Authorized by: Shorty Arriola DO      General Information and Staff    Start Time:  1/1/2025 2:06 PM  End Time:  1/1/2025 2:22 PM  Anesthesiologist:  Shorty Arriola DO  Performed by:  Anesthesiologist  Patient Location:  OB  Site Identification: surface landmarks    Reason for Block: labor epidural    Preanesthetic Checklist: patient identified, IV checked, risks and benefits discussed, monitors and equipment checked, pre-op evaluation, timeout performed, IV bolus, anesthesia consent and sterile technique used      Procedure Details    Patient Position:  Sitting  Prep: ChloraPrep    Monitoring:  Heart rate and continuous pulse ox  Approach:  Midline    Epidural Needle    Injection Technique:  JIGAR air  Needle Type:  Tuohy  Needle Gauge:  17 G  Needle Length:  3.375 in  Needle Insertion Depth:  5.5  Location:  L3-4    Spinal Needle      Catheter    Catheter Type:  End hole  Catheter Size:  19 G  Catheter at Skin Depth:  10.5  Test Dose:  Negative    Assessment    Sensory Level:  T10    Additional Comments         Sterile technique  Chloraprep skin prep  1% Lidocaine skin infiltration  Negative for CSF, heme, and paresthesias.    3mL 1.5% Lidocaine with EPI test dose.  Test Dose negative for CV/CNS effects  100 mcg fentanyl given via epidural.  5mL epidural bolus of 0.125% Bupivicaine with 2mcg/mL fentanyl followed by 12mL/hr epidural infusion.    No apparent anesthetic complications.

## 2025-01-01 NOTE — ANESTHESIA PREPROCEDURE EVALUATION
PRE-OP EVALUATION    Patient Name: Lilli Baltazar    Admit Diagnosis: Pregnancy (HCC) [Z34.90]    Pre-op Diagnosis: * No pre-op diagnosis entered *        Anesthesia Procedure: LABOR ANALGESIA    * No surgeons found in log *    Pre-op vitals reviewed.  Temp: 98.3 °F (36.8 °C)  Pulse: 117  Resp: 16  BP: 133/80  SpO2: 98 %  Body mass index is 34.96 kg/m².    Current medications reviewed.  Hospital Medications:   [COMPLETED] misoprostol (CYTOTEC) partial tablets 25 mcg  25 mcg Oral Once    [COMPLETED] misoprostol (CYTOTEC) partial tablets 25 mcg  25 mcg Vaginal Once    [COMPLETED] potassium chloride (Klor-Con M20) tab 40 mEq  40 mEq Oral Once    [COMPLETED] potassium chloride (Klor-Con) 20 MEQ oral powder 40 mEq  40 mEq Oral Once    lactated ringers IV bolus 1,000 mL  1,000 mL Intravenous Once    fentaNYL-bupivacaine 2 mcg/mL-0.125% in sodium chloride 0.9% 100 mL EPIDURAL infusion premix  12 mL/hr Epidural Continuous    [COMPLETED] fentaNYL (Sublimaze) 50 mcg/mL injection 100 mcg  100 mcg Epidural Once    lidocaine 1.5%-EPINEPHrine 1:200,000 (Xylocaine-Epinephrine) injection  5 mL Injection PRN    bupivacaine PF (Marcaine) 0.25% injection  30 mL Injection PRN    lidocaine PF (Xylocaine-MPF) 2% injection  5 mL Injection PRN    sodium chloride 0.9% PF injection 10 mL  10 mL Injection PRN    ePHEDrine (PF) 25 MG/5 ML injection 5 mg  5 mg Intravenous PRN    nalbuphine (Nubain) 10 mg/mL injection 2.5 mg  2.5 mg Intravenous Q15 Min PRN    sodium chloride 0.9% PF 0.9% injection        fentaNYL-bupivacaine in sodium chloride 0.9% 2 mcg/mL-0.125% EPIDURAL infusion premix        fentaNYL (Sublimaze) 50 mcg/mL injection        ePHEDrine (PF) 25 MG/5 ML injection        lactated ringers infusion   Intravenous Continuous    dextrose in lactated ringers 5% infusion   Intravenous PRN    lactated ringers IV bolus 500 mL  500 mL Intravenous PRN    acetaminophen (Tylenol Extra Strength) tab 500 mg  500 mg Oral Q6H PRN    acetaminophen  (Tylenol Extra Strength) tab 1,000 mg  1,000 mg Oral Q6H PRN    ibuprofen (Motrin) tab 600 mg  600 mg Oral Once PRN    ondansetron (Zofran) 4 MG/2ML injection 4 mg  4 mg Intravenous Q6H PRN    oxyTOCIN in sodium chloride 0.9% (Pitocin) 30 Units/500mL infusion premix  62.5-900 erna-units/min Intravenous Continuous    terbutaline (Brethine) 1 MG/ML injection 0.25 mg  0.25 mg Subcutaneous PRN    sodium citrate-citric acid (Bicitra) 500-334 MG/5ML oral solution 30 mL  30 mL Oral PRN    calcium carbonate (Tums) chewable tab 1,000 mg  1,000 mg Oral Q4H PRN    [COMPLETED] misoprostol (CYTOTEC) partial tablets 25 mcg  25 mcg Oral Q2H    clindamycin phosphate in NaCl 0.9% (Cleocin) 900 mg/50mL IVPB premix 900 mg  900 mg Intravenous Q8H    oxyTOCIN in sodium chloride 0.9% (Pitocin) 30 Units/500mL infusion premix  0.5-20 erna-units/min Intravenous Continuous       Outpatient Medications:   Prescriptions Prior to Admission[1]    Allergies: Penicillins      Anesthesia Evaluation    Patient summary reviewed.    Anesthetic Complications  (+) history of anesthetic complications  History of: PONV       GI/Hepatic/Renal                                 Cardiovascular  Comment: Patient denies hx of chest pain/tightness, MI, or cardiac disease.    Negative cardiovascular ROS.    Exercise tolerance: good     MET: >4    (+) obesity  (+) hypertension                       (-) angina     (-) SALAZAR         Endo/Other                                  Pulmonary      (-) asthma         (-) shortness of breath     (-) sleep apnea       Neuro/Psych                              No history of anticoagulant use or bleeding diatheses.          Past Surgical History:   Procedure Laterality Date    Hysteroscopy  2023    UTERINE POLYP REMOVED      Other surgical history      ELECTIVE  -[employer]    Other surgical history      WISDOM TEETH  SURGERY     Social History     Socioeconomic History    Marital status:    Tobacco Use     Smoking status: Never    Smokeless tobacco: Never   Vaping Use    Vaping status: Former   Substance and Sexual Activity    Alcohol use: Not Currently    Drug use: Never     History   Drug Use Unknown     Available pre-op labs reviewed.  Lab Results   Component Value Date    WBC 10.5 12/31/2024    RBC 3.90 12/31/2024    HGB 11.0 (L) 12/31/2024    HCT 33.3 (L) 12/31/2024    MCV 85.4 12/31/2024    MCH 28.2 12/31/2024    MCHC 33.0 12/31/2024    RDW 19.9 12/31/2024    .0 12/31/2024     Lab Results   Component Value Date     12/31/2024    K 3.4 (L) 01/01/2025     12/31/2024    CO2 23.0 12/31/2024    BUN 5 (L) 12/31/2024    CREATSERUM 0.58 12/31/2024    GLU 77 12/31/2024    CA 9.9 12/31/2024            Airway      Mallampati: II  Mouth opening: >3 FB  TM distance: 4 - 6 cm  Neck ROM: full Cardiovascular    Cardiovascular exam normal.  Rhythm: regular  Rate: normal     Dental    Dentition appears grossly intact         Pulmonary    Pulmonary exam normal.  Breath sounds clear to auscultation bilaterally.               Other findings              ASA: 2   Plan: epidural  NPO status verified and     Post-procedure pain management plan discussed with surgeon and patient.      Plan/risks discussed with: patient                Present on Admission:  **None**             [1]   Medications Prior to Admission   Medication Sig Dispense Refill Last Dose/Taking    prenatal vitamin with DHA 27-0.8-228 MG Oral Cap Take 1 capsule by mouth daily.   12/31/2024 at  8:00 AM    aspirin 81 MG Oral Tab EC Take 1 tablet (81 mg total) by mouth daily.   12/31/2024 at  8:00 AM    escitalopram 20 MG Oral Tab Take 1 tablet (20 mg total) by mouth daily.   12/31/2024 at  6:00 PM

## 2025-01-01 NOTE — PROGRESS NOTES
Pt is a 34 year old female admitted to 113/[facility]-A.     Chief Complaint   Patient presents with    Scheduled Induction      schedule IOL for gHTN. Endorses good fetal movement, denies contractions, denies leakage of fluid and vaginal bleeding.       Pt is  38w5d intra-uterine pregnancy.  History obtained, consents signed. Oriented to room, staff, and plan of care.     EFM tested and applied. Abdomen soft and non-tender.

## 2025-01-01 NOTE — PLAN OF CARE
Problem: BIRTH - VAGINAL/ SECTION  Goal: Fetal and maternal status remain reassuring during the birth process  Description: INTERVENTIONS:  - Monitor vital signs  - Monitor fetal heart rate  - Monitor uterine activity  - Monitor labor progression (vaginal delivery)  - DVT prophylaxis (C/S delivery)  - Surgical antibiotic prophylaxis (C/S delivery)  Outcome: Progressing     Problem: PAIN - ADULT  Goal: Verbalizes/displays adequate comfort level or patient's stated pain goal  Description: INTERVENTIONS:  - Encourage pt to monitor pain and request assistance  - Assess pain using appropriate pain scale  - Administer analgesics based on type and severity of pain and evaluate response  - Implement non-pharmacological measures as appropriate and evaluate response  - Consider cultural and social influences on pain and pain management  - Manage/alleviate anxiety  - Utilize distraction and/or relaxation techniques  - Monitor for opioid side effects  - Notify MD/LIP if interventions unsuccessful or patient reports new pain  - Anticipate increased pain with activity and pre-medicate as appropriate  Outcome: Progressing     Problem: ANXIETY  Goal: Will report anxiety at manageable levels  Description: INTERVENTIONS:  - Administer medication as ordered  - Teach and rehearse alternative coping skills  - Provide emotional support with 1:1 interaction with staff  Outcome: Progressing     Problem: Patient/Family Goals  Goal: Patient/Family Long Term Goal  Description: Patient's Long Term Goal: Uncomplicated vaginal delivery    Interventions:  - See additional Care Plan goals for specific interventions  Outcome: Progressing  Goal: Patient/Family Short Term Goal  Description: Patient's Short Term Goal: Maternal/fetal wellbeing, blood pressures under control    Interventions:   - See additional Care Plan goals for specific interventions  Outcome: Progressing

## 2025-01-02 PROCEDURE — 10H07YZ INSERTION OF OTHER DEVICE INTO PRODUCTS OF CONCEPTION, VIA NATURAL OR ARTIFICIAL OPENING: ICD-10-PCS | Performed by: OBSTETRICS & GYNECOLOGY

## 2025-01-02 PROCEDURE — 76942 ECHO GUIDE FOR BIOPSY: CPT | Performed by: ANESTHESIOLOGY

## 2025-01-02 PROCEDURE — 59514 CESAREAN DELIVERY ONLY: CPT | Performed by: OBSTETRICS & GYNECOLOGY

## 2025-01-02 RX ORDER — KETOROLAC TROMETHAMINE 30 MG/ML
30 INJECTION, SOLUTION INTRAMUSCULAR; INTRAVENOUS EVERY 6 HOURS
Status: COMPLETED | OUTPATIENT
Start: 2025-01-02 | End: 2025-01-03

## 2025-01-02 RX ORDER — METOCLOPRAMIDE HYDROCHLORIDE 5 MG/ML
INJECTION INTRAMUSCULAR; INTRAVENOUS AS NEEDED
Status: DISCONTINUED | OUTPATIENT
Start: 2025-01-02 | End: 2025-01-02 | Stop reason: SURG

## 2025-01-02 RX ORDER — CITRIC ACID/SODIUM CITRATE 334-500MG
SOLUTION, ORAL ORAL
Status: COMPLETED
Start: 2025-01-02 | End: 2025-01-02

## 2025-01-02 RX ORDER — KETOROLAC TROMETHAMINE 30 MG/ML
INJECTION, SOLUTION INTRAMUSCULAR; INTRAVENOUS AS NEEDED
Status: DISCONTINUED | OUTPATIENT
Start: 2025-01-02 | End: 2025-01-02 | Stop reason: SURG

## 2025-01-02 RX ORDER — DIPHENHYDRAMINE HCL 25 MG
25 CAPSULE ORAL EVERY 4 HOURS PRN
Status: DISCONTINUED | OUTPATIENT
Start: 2025-01-02 | End: 2025-01-05

## 2025-01-02 RX ORDER — ACETAMINOPHEN 500 MG
1000 TABLET ORAL EVERY 6 HOURS
Status: DISCONTINUED | OUTPATIENT
Start: 2025-01-02 | End: 2025-01-05

## 2025-01-02 RX ORDER — GABAPENTIN 300 MG/1
300 CAPSULE ORAL EVERY 8 HOURS SCHEDULED
Status: DISCONTINUED | OUTPATIENT
Start: 2025-01-02 | End: 2025-01-05

## 2025-01-02 RX ORDER — TRANEXAMIC ACID 10 MG/ML
INJECTION, SOLUTION INTRAVENOUS
Status: DISCONTINUED
Start: 2025-01-02 | End: 2025-01-02 | Stop reason: WASHOUT

## 2025-01-02 RX ORDER — CHOLECALCIFEROL (VITAMIN D3) 25 MCG
1 TABLET,CHEWABLE ORAL DAILY
Status: DISCONTINUED | OUTPATIENT
Start: 2025-01-02 | End: 2025-01-05

## 2025-01-02 RX ORDER — BUPIVACAINE HYDROCHLORIDE 2.5 MG/ML
INJECTION, SOLUTION EPIDURAL; INFILTRATION; INTRACAUDAL AS NEEDED
Status: DISCONTINUED | OUTPATIENT
Start: 2025-01-02 | End: 2025-01-02 | Stop reason: SURG

## 2025-01-02 RX ORDER — ONDANSETRON 2 MG/ML
4 INJECTION INTRAMUSCULAR; INTRAVENOUS EVERY 6 HOURS PRN
Status: DISCONTINUED | OUTPATIENT
Start: 2025-01-02 | End: 2025-01-05

## 2025-01-02 RX ORDER — ROPIVACAINE HYDROCHLORIDE 5 MG/ML
INJECTION, SOLUTION EPIDURAL; INFILTRATION; PERINEURAL AS NEEDED
Status: DISCONTINUED | OUTPATIENT
Start: 2025-01-02 | End: 2025-01-02 | Stop reason: SURG

## 2025-01-02 RX ORDER — MORPHINE SULFATE 0.5 MG/ML
INJECTION, SOLUTION EPIDURAL; INTRATHECAL; INTRAVENOUS AS NEEDED
Status: DISCONTINUED | OUTPATIENT
Start: 2025-01-02 | End: 2025-01-02 | Stop reason: SURG

## 2025-01-02 RX ORDER — DIPHENHYDRAMINE HYDROCHLORIDE 50 MG/ML
12.5 INJECTION INTRAMUSCULAR; INTRAVENOUS EVERY 4 HOURS PRN
Status: DISCONTINUED | OUTPATIENT
Start: 2025-01-02 | End: 2025-01-05

## 2025-01-02 RX ORDER — BISACODYL 10 MG
10 SUPPOSITORY, RECTAL RECTAL ONCE AS NEEDED
Status: DISCONTINUED | OUTPATIENT
Start: 2025-01-02 | End: 2025-01-05

## 2025-01-02 RX ORDER — SODIUM CHLORIDE, SODIUM LACTATE, POTASSIUM CHLORIDE, CALCIUM CHLORIDE 600; 310; 30; 20 MG/100ML; MG/100ML; MG/100ML; MG/100ML
INJECTION, SOLUTION INTRAVENOUS CONTINUOUS PRN
Status: DISCONTINUED | OUTPATIENT
Start: 2025-01-02 | End: 2025-01-02 | Stop reason: SURG

## 2025-01-02 RX ORDER — DEXTROSE, SODIUM CHLORIDE, SODIUM LACTATE, POTASSIUM CHLORIDE, AND CALCIUM CHLORIDE 5; .6; .31; .03; .02 G/100ML; G/100ML; G/100ML; G/100ML; G/100ML
INJECTION, SOLUTION INTRAVENOUS CONTINUOUS PRN
Status: DISCONTINUED | OUTPATIENT
Start: 2025-01-02 | End: 2025-01-05

## 2025-01-02 RX ORDER — SIMETHICONE 80 MG
80 TABLET,CHEWABLE ORAL 3 TIMES DAILY PRN
Status: DISCONTINUED | OUTPATIENT
Start: 2025-01-02 | End: 2025-01-05

## 2025-01-02 RX ORDER — ACETAMINOPHEN 500 MG
1000 TABLET ORAL ONCE
Status: DISCONTINUED | OUTPATIENT
Start: 2025-01-02 | End: 2025-01-02

## 2025-01-02 RX ORDER — LIDOCAINE HCL/EPINEPHRINE/PF 2%-1:200K
VIAL (ML) INJECTION AS NEEDED
Status: DISCONTINUED | OUTPATIENT
Start: 2025-01-02 | End: 2025-01-02 | Stop reason: SURG

## 2025-01-02 RX ORDER — NALOXONE HYDROCHLORIDE 0.4 MG/ML
0.08 INJECTION, SOLUTION INTRAMUSCULAR; INTRAVENOUS; SUBCUTANEOUS
Status: ACTIVE | OUTPATIENT
Start: 2025-01-02 | End: 2025-01-03

## 2025-01-02 RX ORDER — ONDANSETRON 2 MG/ML
INJECTION INTRAMUSCULAR; INTRAVENOUS AS NEEDED
Status: DISCONTINUED | OUTPATIENT
Start: 2025-01-02 | End: 2025-01-02 | Stop reason: SURG

## 2025-01-02 RX ORDER — IBUPROFEN 600 MG/1
600 TABLET, FILM COATED ORAL EVERY 6 HOURS
Status: DISCONTINUED | OUTPATIENT
Start: 2025-01-03 | End: 2025-01-05

## 2025-01-02 RX ORDER — DOCUSATE SODIUM 100 MG/1
100 CAPSULE, LIQUID FILLED ORAL
Status: DISCONTINUED | OUTPATIENT
Start: 2025-01-02 | End: 2025-01-05

## 2025-01-02 RX ORDER — MISOPROSTOL 200 UG/1
TABLET ORAL
Status: DISCONTINUED
Start: 2025-01-02 | End: 2025-01-02 | Stop reason: WASHOUT

## 2025-01-02 RX ORDER — HYDROMORPHONE HYDROCHLORIDE 1 MG/ML
0.4 INJECTION, SOLUTION INTRAMUSCULAR; INTRAVENOUS; SUBCUTANEOUS EVERY 2 HOUR PRN
Status: ACTIVE | OUTPATIENT
Start: 2025-01-02 | End: 2025-01-03

## 2025-01-02 RX ORDER — ESCITALOPRAM OXALATE 20 MG/1
20 TABLET ORAL NIGHTLY
Status: DISCONTINUED | OUTPATIENT
Start: 2025-01-02 | End: 2025-01-05

## 2025-01-02 RX ORDER — NALBUPHINE HYDROCHLORIDE 10 MG/ML
2.5 INJECTION INTRAMUSCULAR; INTRAVENOUS; SUBCUTANEOUS EVERY 4 HOURS PRN
Status: DISCONTINUED | OUTPATIENT
Start: 2025-01-02 | End: 2025-01-05

## 2025-01-02 RX ORDER — AMMONIA 15 % (W/V)
0.3 AMPUL (EA) INHALATION AS NEEDED
Status: DISCONTINUED | OUTPATIENT
Start: 2025-01-02 | End: 2025-01-05

## 2025-01-02 RX ADMIN — METOCLOPRAMIDE HYDROCHLORIDE 5 MG: 5 INJECTION INTRAMUSCULAR; INTRAVENOUS at 17:42:00

## 2025-01-02 RX ADMIN — ONDANSETRON 4 MG: 2 INJECTION INTRAMUSCULAR; INTRAVENOUS at 17:42:00

## 2025-01-02 RX ADMIN — LIDOCAINE HCL/EPINEPHRINE/PF 2 ML: 2%-1:200K VIAL (ML) INJECTION at 18:12:00

## 2025-01-02 RX ADMIN — ROPIVACAINE HYDROCHLORIDE 20 ML: 5 INJECTION, SOLUTION EPIDURAL; INFILTRATION; PERINEURAL at 18:46:00

## 2025-01-02 RX ADMIN — SODIUM CHLORIDE, SODIUM LACTATE, POTASSIUM CHLORIDE, CALCIUM CHLORIDE: 600; 310; 30; 20 INJECTION, SOLUTION INTRAVENOUS at 18:48:00

## 2025-01-02 RX ADMIN — METOCLOPRAMIDE HYDROCHLORIDE 5 MG: 5 INJECTION INTRAMUSCULAR; INTRAVENOUS at 17:52:00

## 2025-01-02 RX ADMIN — KETOROLAC TROMETHAMINE 30 MG: 30 INJECTION, SOLUTION INTRAMUSCULAR; INTRAVENOUS at 18:24:00

## 2025-01-02 RX ADMIN — SODIUM CHLORIDE, SODIUM LACTATE, POTASSIUM CHLORIDE, CALCIUM CHLORIDE: 600; 310; 30; 20 INJECTION, SOLUTION INTRAVENOUS at 17:40:00

## 2025-01-02 RX ADMIN — LIDOCAINE HCL/EPINEPHRINE/PF 5 ML: 2%-1:200K VIAL (ML) INJECTION at 17:50:00

## 2025-01-02 RX ADMIN — MORPHINE SULFATE 4 MG: 0.5 INJECTION, SOLUTION EPIDURAL; INTRATHECAL; INTRAVENOUS at 18:32:00

## 2025-01-02 RX ADMIN — LIDOCAINE HCL/EPINEPHRINE/PF 1 ML: 2%-1:200K VIAL (ML) INJECTION at 17:57:00

## 2025-01-02 RX ADMIN — LIDOCAINE HCL/EPINEPHRINE/PF 2 ML: 2%-1:200K VIAL (ML) INJECTION at 18:04:00

## 2025-01-02 RX ADMIN — BUPIVACAINE HYDROCHLORIDE 5 ML: 2.5 INJECTION, SOLUTION EPIDURAL; INFILTRATION; INTRACAUDAL at 00:02:00

## 2025-01-02 RX ADMIN — LIDOCAINE HCL/EPINEPHRINE/PF 5 ML: 2%-1:200K VIAL (ML) INJECTION at 17:40:00

## 2025-01-02 NOTE — PROGRESS NOTES
Care assumed. Patient is in a stable condition. Cook balloon still intact. Denies headache, changes in vision, and epigastric pain.

## 2025-01-02 NOTE — PROGRESS NOTES
Patient is doing wee s/p epidural  FHT - 140's mod variability noted.  [place] - irregular cx noted  SVE - 6/90%-1 station, small caput noted. IUPC was placed.  Plan- Will continue pitocin per protocol  Can increase the pitocin to 26 to get adequate Cx  Await vaginal deliver  Discussed with patient and family.

## 2025-01-02 NOTE — PROGRESS NOTES
Patient resting quietly with epidural in place.    --------------------            01/01/25 2025     --------------------   BP:       138/73     Pulse:      80       Resp:                Temp:               --------------------    EFM:    FHR baseline 135  Accelerations to 160 bpm  CTX q 2-3 min    Rayo Bowers Removed    AROM: thin meconium stained fluid    CVX: 40%/3cm/-2    ASSESSMENT:    IUP 38 6/7 weeks  Gestational Hypertension    PLAN:   Continue IV pitocin  Continue close observation

## 2025-01-02 NOTE — PROGRESS NOTES
Patient was seen and examined. Pitocin at 26 mu with not adequate MVU's  for the last 6 + hours. She is comfortable with epidural   FHT - 140's mod variability noted.  SVE - 6 cm, 80 %, zero station, Caput noted.  Discussed with patient and family regarding the active phase arrest and the recommendation is to proceed with the Csection. They were agreeable to proceed with the surgery. Discussed about the procedure, indication and complications of the procedure were discussed and all their questions were answered.

## 2025-01-02 NOTE — H&P
[facility]    PATIENT'S NAME: AUGUSTINE SOTO   ATTENDING PHYSICIAN: Lo Montoya M.D.   PATIENT ACCOUNT#:   [id number]    LOCATION:  [address] [place] [address]  MEDICAL RECORD #:   [id number]       YOB: 1990  ADMISSION DATE:       2024    HISTORY AND PHYSICAL EXAMINATION    CHIEF COMPLAINT:  \"I'm here for induction.\"    HISTORY OF PRESENT ILLNESS:  The patient is a 34-year-old female,  3, para 0-0-2-0, EDC 2025, admitted at 38-5/7 weeks' gestation for induction of labor secondary to gestational hypertension.  Good fetal movement.  No vaginal bleeding.  No nausea, vomiting, fever, chills, diarrhea.  No other complaints.    PAST MEDICAL HISTORY:  History of anxiety.  History of PCOS; however, she had regular menses and the diagnosis is questionable.  She also had a history of endometrial polyps, did have a polypectomy in .  History of tobacco addiction and quit in .    PAST SURGICAL HISTORY:  Elective AB x1 with D and C and missed AB x1 2024.  On 2023, hysteroscopic endometrial polypectomy.    MEDICATIONS:  Present medications:  Lexapro 20 mg 1 tab p.o. daily, prenatal vitamins, baby aspirin 1 tab p.o. daily.    ALLERGIES:  The patient is allergic to penicillin.  She had a reaction as a child.  She has not had testing as an adult.    FAMILY HISTORY:  Breast cancer in a maternal aunt in her 40s.    SOCIAL HISTORY:  No present tobacco.  No present EtOH.  No drugs.    GYNECOLOGICAL HISTORY:  Regular menses every 26 days, lasting 5 days.  No history of any sexually transmitted diseases.  No history of an abnormal Pap smear.    OBSTETRICAL HISTORY:   3, para 0-0-2-0.  In 2006, elective AB, age 16.  On 2024, early embryonic demise, suction D and C on 2024, tetraploid in 8 cells and 46 XX in 12 cells.   Present pregnancy.  Patient is noted to be A positive, Rubella immune, hepatitis B surface antigen nonreactive, HIV negative x2, RPR negative x2,  beta strep at 36 weeks positive.  Panorama low risk.  NT within normal limits.    PHYSICAL EXAMINATION:    VITAL SIGNS:  The patient is 5 feet.  She is approximately 179 pounds.  Blood pressure 143/78, pulse 92, temperature 98.3.  ABDOMEN:  Term uterus.  Estimated fetal weight approximately 8 to 8-1/2 pounds.  Cephalic.  Positive fetal heart tones.   PELVIC:  Cervix on admission 30%, fingertip, -3 station.  Reactive tracing.    ASSESSMENT:    1.   Intrauterine pregnancy 38-5/6 weeks.  2.   Gestational hypertension.  3.   Anxiety, on Lexapro.  4.   Body mass index 30 and possible diagnosis of polycystic ovarian syndrome.  5.   History of anemia status post IV iron.  6.   Positive beta strep.    PLAN:  Routine admission orders.  Will begin with Cytotec induction.  Will observe closely.  Anticipate .  Clindamycin beta strep prophylaxis.    Dictated By Lo Montoya M.D.  d: 2025 15:41:21  t: 2025 15:49:54  [facility] [id number]  [facility]/

## 2025-01-03 LAB
ALBUMIN SERPL-MCNC: 3 G/DL (ref 3.2–4.8)
ALBUMIN/GLOB SERPL: 1.3 {RATIO} (ref 1–2)
ALP LIVER SERPL-CCNC: 101 U/L
ALT SERPL-CCNC: <7 U/L
ANION GAP SERPL CALC-SCNC: 10 MMOL/L (ref 0–18)
AST SERPL-CCNC: 18 U/L (ref ?–34)
BASOPHILS # BLD AUTO: 0.04 X10(3) UL (ref 0–0.2)
BASOPHILS NFR BLD AUTO: 0.3 %
BILIRUB SERPL-MCNC: 0.3 MG/DL (ref 0.3–1.2)
BUN BLD-MCNC: 5 MG/DL (ref 9–23)
CALCIUM BLD-MCNC: 8.6 MG/DL (ref 8.7–10.4)
CHLORIDE SERPL-SCNC: 108 MMOL/L (ref 98–112)
CO2 SERPL-SCNC: 22 MMOL/L (ref 21–32)
CREAT BLD-MCNC: 0.77 MG/DL
EGFRCR SERPLBLD CKD-EPI 2021: 105 ML/MIN/1.73M2 (ref 60–?)
EOSINOPHIL # BLD AUTO: 0.11 X10(3) UL (ref 0–0.7)
EOSINOPHIL NFR BLD AUTO: 0.7 %
ERYTHROCYTE [DISTWIDTH] IN BLOOD BY AUTOMATED COUNT: 20.4 %
GLOBULIN PLAS-MCNC: 2.4 G/DL (ref 2–3.5)
GLUCOSE BLD-MCNC: 80 MG/DL (ref 70–99)
HCT VFR BLD AUTO: 30.6 %
HGB BLD-MCNC: 9.8 G/DL
IMM GRANULOCYTES # BLD AUTO: 0.11 X10(3) UL (ref 0–1)
IMM GRANULOCYTES NFR BLD: 0.7 %
LYMPHOCYTES # BLD AUTO: 1.05 X10(3) UL (ref 1–4)
LYMPHOCYTES NFR BLD AUTO: 6.9 %
MCH RBC QN AUTO: 28.5 PG (ref 26–34)
MCHC RBC AUTO-ENTMCNC: 32 G/DL (ref 31–37)
MCV RBC AUTO: 89 FL
MONOCYTES # BLD AUTO: 0.87 X10(3) UL (ref 0.1–1)
MONOCYTES NFR BLD AUTO: 5.7 %
NEUTROPHILS # BLD AUTO: 13.07 X10 (3) UL (ref 1.5–7.7)
NEUTROPHILS # BLD AUTO: 13.07 X10(3) UL (ref 1.5–7.7)
NEUTROPHILS NFR BLD AUTO: 85.7 %
OSMOLALITY SERPL CALC.SUM OF ELEC: 286 MOSM/KG (ref 275–295)
PLATELET # BLD AUTO: 168 10(3)UL (ref 150–450)
POTASSIUM SERPL-SCNC: 3.1 MMOL/L (ref 3.5–5.1)
PROT SERPL-MCNC: 5.4 G/DL (ref 5.7–8.2)
RBC # BLD AUTO: 3.44 X10(6)UL
SODIUM SERPL-SCNC: 140 MMOL/L (ref 136–145)
WBC # BLD AUTO: 15.3 X10(3) UL (ref 4–11)

## 2025-01-03 NOTE — PLAN OF CARE
Problem: POSTPARTUM  Goal: Optimize infant feeding at the breast  Description: INTERVENTIONS:  - Monitor effectiveness of current breast feeding efforts.  - Assess support systems available to mother/family.  - Identify cultural beliefs/practices regarding lactation, letdown techniques, maternal food preferences.  - Assess mother's knowledge and previous experience with breast feeding.  - Discuss/demonstrate breast feeding aids (breast pumps).  - Encourage mother/other family members to express feelings/concerns, and actively listen.  - Educate father/SO about benefits of breast feeding and how to manage common lactation challenges.  - Recommend avoidance of specific medications or substances incompatible with breast feeding.  - Assess and monitor for signs of nipple pain/trauma.  - Review techniques for milk expression (breast pumping) and storage of breast milk. Provide pumping equipment/supplies, instructions and assistance, as needed.  - Encourage skin-to-skin contact.  - Provide LC support as needed.  - Assess for and manage engorgement.  - Provide breast feeding education handouts and information on community breast feeding support.   Outcome: Progressing     Problem: POSTPARTUM  Goal: Establishment of adequate milk supply with medication/procedure interruptions  Description: INTERVENTIONS:  - Review techniques for milk expression (breast pumping).   - Provide pumping equipment/supplies, instructions, and assistance until it is safe to breastfeed infant.  Outcome: Progressing     Problem: POSTPARTUM  Goal: Appropriate maternal -  bonding  Description: INTERVENTIONS:  - Assess caregiver's emotional status and coping mechanisms.  - Encourage caregiver to participate in  daily care.  - Assess support systems available to mother/family.  - Provide /case management support as needed.  Outcome: Progressing

## 2025-01-03 NOTE — PROGRESS NOTES
[facility] 1SW-J dixie    Lilli Baltazar Patient Status:  Inpatient   Age/Gender 34 year old female MRN [id number]   Location [facility] 1SW-J Attending Lo Montoya MD   Hosp Day # 3 PCP No primary care provider on file.      Anesthesia Pain Progress Note    Anesthesia Technique:   Epidural Anesthesia     Pain Management Technique:  In addition to available oral supplemental and IV medications  Patient received neuraxial preservative free morphine for post procedural pain control.    Post Procedure Pain Quality:    Adequate    Pain Management Side Effects:  None     /87 (BP Location: Left arm)   Pulse 80   Temp 97.1 °F (36.2 °C) (Axillary)   Resp 18   Ht 1.524 m (5')   Wt 81.2 kg (179 lb)   LMP 2024   SpO2 95%   Breastfeeding Yes   BMI 34.96 kg/m²       Injection Site: Injection site is intact on inspection     Complications from Pain Management or Anesthesia:   None    All patient questions were answered.  Follow up pain management is separate from intraoperative anesthetic needs.  Pain care is transitioned to primary service, with management by oral medications.    Thank you for asking us to participate in the care of your patient.    Inocente Coe MD, 25, 8:43 AM      Inocente Coe MD, 25, 8:43 AM

## 2025-01-03 NOTE — OPERATIVE REPORT
[facility]  Post-Op  Procedure Note    Lilli Baltazar Patient Status:  Inpatient    1990 MRN [id number]   Location [facility] LABOR & DELIVERY Attending Lo Montoya MD   Hosp Day # 2 PCP No primary care provider on file.       Preop diagnosis: 39w0d IUP PCS arrest of dilatation    Postop diagnosis: Same as preop, Delivered.    Procedure: Low transverse  delivery    Surgeon: Erasmo BADILLO    Assistant: Dr. Nolasco     Anesthesia: Epidural    Anesthesiologist: Cole Covington MD     Indication for the procedure: Briefly the patient is 34 year old G 3 P0 presenting for IOL due to gestational HTN. She had cervical ripening with misoprostol and had cook balloon, followed by AROM with pitocin. She was on pitocin for more than 24 hrs and with IUPC placed in the last 12 hours approximately. She progressed to 6 cm and did not make any cervical change for more than 6 hrs in spite of being on pitocin and hence Csection was recommended for arrest of dilatation.    Findings: Viable male infant weighing 7 # 13Oz  with Apgars 7/9        Placenta delivered spontaneously and intact. Tubes and ovaries WNL bilaterally.    Specimens: Cord blood    EBL: 800 cc    IVF: Per anaesthesia    Urine Out put: clear 400 cc    Complications: None    Disposition: Recovery room in stable condition.    Procedure:   After insuring informed consent, the patient was taken to the operating room where anesthesia was administered and found to be adequate.  She was placed in the supine position with a left lateral tilt. The abdomen was prepped and draped in the usual sterile fashion. Time out was carried out in standard manner.   A Pfannenstiel skin incision was made with a scalpel  and taken down to the underlying fascia. The fascia was incised and extended bilaterally with the finch scissors. Kocher clamps were placed on the fascial edges. While tenting up on the fascia, the rectus muscles were dissected off the  fascia using sharp and blunt dissection. The rectus muscles were  at the midline. The peritoneum was entered and extended bluntly. The bladder blade was used to retract the bladder.   The vesicouterine peritoneum was identified and grasped with pickups. The vesicouterine peritoneum was entered sharply and extended bilaterally with metzanbaum scissors. The bladder bladder was repositioned to retract the bladder.   A low transverse uterine incision was made with the scalpel. The incision was extended with blunt stretching in the cephalad-caudal direction. The membranes were ruptured. The fetal head was delivered through the uterine incision without difficulty. Shoulders and torso followed easily. The cord was clamped and cut. The infant was brought to the warmer to the awaiting neonatology team.   The placenta delivered spontaneously and intact. The uterus was exteriorized and cleared of all clots and debris. The uterine incision was closed with #1 Monocryl in a running-locking fashion. A second layer of the same suture was done imbricating the first layer. Good hemostasis was confirmed at the level of the uterus. The uterus was returned to the abdomen. Gutters were cleared of all the clots and debris.   The pelvis was reexamined for hemostasis. The rectus muscles were approximated with one figure of eight suture. The fascia was closed with #1 vicryl in a running fashion.  The subcutaneous layer was closed with 3-0 vicryl. The skin was closed with 3-0 monocryl in a subcuticular manner. Surgical glue was applied as dressing.   All sponge, lap, needle and instrument counts were correct times two. The patient tolerated the procedure well and was taken to the recovery room in stable condition. The baby taken to NICU. She received antibiotics for surgical prophylaxis.      Angela Zimmerman MD  1/2/2025

## 2025-01-03 NOTE — PROGRESS NOTES
Pt very tired, slept most of night.  @0300 complained of double vision, stated it started with surgery. Denies headache and epigastric pain. BP in parameters  @0520 states vision improving.  Assisted up to bathroom for pericare.   Sitting at bedside, 's, feels a little dizzy but ambulated to bathroom and otherwise tolerated well.

## 2025-01-03 NOTE — ANESTHESIA PROCEDURE NOTES
[facility]    Date/Time: 1/2/2025 6:44 PM    Performed by: Cole Covington MD  Authorized by: Cole Covington MD      General Information and Staff    Start Time:  1/2/2025 6:44 PM  End Time:  1/2/2025 6:46 PM  Anesthesiologist:  Cole Covington MD  Performed by:  Anesthesiologist  Patient Location:  OR      Site Identification: real time ultrasound guided and image stored and retrievable    Block site/laterality marked before start: site marked  Reason for Block: at surgeon's request and post-op pain management    Preanesthetic Checklist: 2 patient identifers, IV checked, risks and benefits discussed, monitors and equipment checked, pre-op evaluation, timeout performed, anesthesia consent, sterile technique used, no prohibitive neurological deficits and no local skin infection at insertion site      Procedure Details    Patient Position:  Supine  Prep: ChloraPrep    Monitoring:  Cardiac monitor, continuous pulse ox, blood pressure cuff and heart rate  Block Type:  TAP  Laterality:  Bilateral  Injection Technique:  Single-shot    Needle    Needle Type:  Short-bevel and echogenic  Needle Gauge:  21 G  Needle Length:  110 mm  Needle Localization:  Ultrasound guidance  Reason for Ultrasound Use: appropriate spread of the medication was noted in real time and no ultrasound evidence of intravascular and/or intraneural injection            Assessment    Injection Assessment:  Good spread noted, negative resistance, negative aspiration for heme, incremental injection and low pressure  Heart Rate Change: No    - Patient tolerated block procedure well without evidence of immediate block related complications.     Medications  1/2/2025 6:44 PM      Additional Comments    Medication:  Ropivacaine 0.25% 40 mL

## 2025-01-03 NOTE — PROGRESS NOTES
Patient transferred to mother/baby room 1111 per cart in stable condition with baby and personal belongings.  Accompanied by  and staff.  Report given to mother/baby RN.

## 2025-01-03 NOTE — ANESTHESIA POSTPROCEDURE EVALUATION
[facility]    Lilli Baltazar Patient Status:  Inpatient   Age/Gender 34 year old female MRN [id number]   Location [facility] LABOR & DELIVERY Attending Lo Montoya MD   Hosp Day # 2 PCP No primary care provider on file.       Anesthesia Post-op Note     SECTION    Procedure Summary       Date: 25 Room / Location:  L+D OR   L+D OR    Anesthesia Start: 1406 Anesthesia Stop: 25 185    Procedure:  SECTION Diagnosis: (39w0d IUP PCS arrest of dilatation)    Surgeons: Angela Zimmerman MD Anesthesiologist: Cole Covington MD    Anesthesia Type: epidural ASA Status: 2            Anesthesia Type: epidural    Vitals Value Taken Time   /65 25 1855   Temp 98.4 °F (36.9 °C) 25 1855   Pulse 118 25 1856   Resp 19 25 1856   SpO2 95 % 25 185   Vitals shown include unfiled device data.    Patient Location: PACU    Anesthesia Type: epidural    Airway Patency: patent    Postop Pain Control: adequate    Mental Status: preanesthetic baseline    Nausea/Vomiting: none    Cardiopulmonary/Hydration status: stable euvolemic    Complications: no apparent anesthesia related complications    Postop vital signs: stable    Comments: Report given to RN    Dental Exam: Unchanged from Preop    Patient to be discharged from PACU when criteria met.

## 2025-01-03 NOTE — CM/SW NOTE
01/03/25 1100   Referral Data   Referral Source Self referral   Referral Reason Discharge planning;Psychosocial assessment;Counseling/support     YVON met with pt and pt's spouse to complete assessment and provide resources due to NICU admission for baby boy.     Pt presented with a cheerful affect. Pt and her spouse live in [place] and this is the first child for the couple. Pt and pt's spouse report adequate time off work. Pt reported a strong support system. Discussed and provided NICU resources. Pt reported a hx of anxiety. Pt stated she has a psychiatrist and she has an appointment in two weeks. Pt shared she is on Lexapro managed by her psychiatrist. SW encouraged pt to reach out to her OB/PCP with any questions or concerns regarding PPA/PPD. Pt and pt's spouse denied any further needs at this time.     YVON provided parent NICU packet of resources for [facility] family room and sleep room area, Antepartum/NICU Facebook page, support groups, and written signs and symptoms of Postpartum Depression/Anxiety with [facility] resources for psychiatrist and counselors. YVON will continue to follow for support.    ÁNGEL Parmar  Discharge Planner

## 2025-01-03 NOTE — PLAN OF CARE
Problem: BIRTH - VAGINAL/ SECTION  Goal: Fetal and maternal status remain reassuring during the birth process  Description: INTERVENTIONS:  - Monitor vital signs  - Monitor fetal heart rate  - Monitor uterine activity  - Monitor labor progression (vaginal delivery)  - DVT prophylaxis (C/S delivery)  - Surgical antibiotic prophylaxis (C/S delivery)  Outcome: Completed     Problem: PAIN - ADULT  Goal: Verbalizes/displays adequate comfort level or patient's stated pain goal  Description: INTERVENTIONS:  - Encourage pt to monitor pain and request assistance  - Assess pain using appropriate pain scale  - Administer analgesics based on type and severity of pain and evaluate response  - Implement non-pharmacological measures as appropriate and evaluate response  - Consider cultural and social influences on pain and pain management  - Manage/alleviate anxiety  - Utilize distraction and/or relaxation techniques  - Monitor for opioid side effects  - Notify MD/LIP if interventions unsuccessful or patient reports new pain  - Anticipate increased pain with activity and pre-medicate as appropriate  Outcome: Completed     Problem: ANXIETY  Goal: Will report anxiety at manageable levels  Description: INTERVENTIONS:  - Administer medication as ordered  - Teach and rehearse alternative coping skills  - Provide emotional support with 1:1 interaction with staff  Outcome: Completed     Problem: Patient/Family Goals  Goal: Patient/Family Long Term Goal  Description: Patient's Long Term Goal: Uncomplicated vaginal delivery    Interventions:  VS per protocol  I&O  Ice chips and sips as tolerated  EFM per protocol  Maintain IV as ordered  Antibiotics as needed per protocol  Informed consent       - See additional Care Plan goals for specific interventions  Outcome: Completed  Goal: Patient/Family Short Term Goal  Description: Patient's Short Term Goal: Adequate pain control with delivery of infant    Interventions:  Pain  assessment scores as ordered  Patient scores pain a \"3\" or less  Multidisciplinary care   Nonpharmacologic comfort measures       - See additional Care Plan goals for specific interventions  Outcome: Completed

## 2025-01-03 NOTE — PLAN OF CARE
NURSING ADMISSION NOTE  Patient admitted via cart after visiting NICU, baby \"Lito\" stable, on O2  Oriented to room.  Safety precautions initiated.  Bed in low position.  Call light in reach.  Pt very tired, a little dizzy, will pump later.  Tolerating sips of ice water    Problem: GASTROINTESTINAL - ADULT  Goal: Minimal or absence of nausea and vomiting  Description: INTERVENTIONS:  - Maintain adequate hydration with IV or PO as ordered and tolerated  - Nasogastric tube to low intermittent suction as ordered  - Evaluate effectiveness of ordered antiemetic medications  - Provide nonpharmacologic comfort measures as appropriate  - Advance diet as tolerated, if ordered  - Obtain nutritional consult as needed  - Evaluate fluid balance  Outcome: Progressing  Goal: Maintains or returns to baseline bowel function  Description: INTERVENTIONS:  - Assess bowel function  - Maintain adequate hydration with IV or PO as ordered and tolerated  - Evaluate effectiveness of GI medications  - Encourage mobilization and activity  - Obtain nutritional consult as needed  - Establish a toileting routine/schedule  - Consider collaborating with pharmacy to review patient's medication profile  Outcome: Progressing  Goal: Maintains adequate nutritional intake (undernourished)  Description: INTERVENTIONS:  - Obtain nutritional consult as needed  - Optimize oral hygiene and moisture  - Encourage food from home; allow for food preferences  Outcome: Progressing     Problem: POSTPARTUM  Goal: Long Term Goal:Experiences normal postpartum course  Description: INTERVENTIONS:  - Assess and monitor vital signs and lab values.  - Assess fundus and lochia.  - Monitor healing of incision, and assess for signs and symptoms of infection and hematoma.  - Assess bladder function and monitor for bladder distention.  - Provide/instruct/assist with pericare as needed.  - Provide VTE prophylaxis as needed.  - Monitor bowel function.  - Encourage ambulation  and provide assistance as needed.  - Assess and monitor emotional status and provide social service/psych resources as needed.  - Utilize standard precautions and use personal protective equipment as indicated. Ensure aseptic care of all intravenous lines and invasive tubes/drains.  - Obtain immunization and exposure to communicable diseases history.  Outcome: Progressing  Goal: Optimize infant feeding at the breast  Description: INTERVENTIONS:  - Monitor effectiveness of current breast feeding efforts.  - Assess support systems available to mother/family.  - Identify cultural beliefs/practices regarding lactation, letdown techniques, maternal food preferences.  - Assess mother's knowledge and previous experience with breast feeding.  - Discuss/demonstrate breast feeding aids (breast pumps).  - Encourage mother/other family members to express feelings/concerns, and actively listen.  - Educate father/SO about benefits of breast feeding and how to manage common lactation challenges.  - Recommend avoidance of specific medications or substances incompatible with breast feeding.  - Assess and monitor for signs of nipple pain/trauma.  - Review techniques for milk expression (breast pumping) and storage of breast milk. Provide pumping equipment/supplies, instructions and assistance, as needed.  - Encourage skin-to-skin contact.  - Provide LC support as needed.  - Assess for and manage engorgement.  - Provide breast feeding education handouts and information on community breast feeding support.   Outcome: Progressing  Goal: Establishment of adequate milk supply with medication/procedure interruptions  Description: INTERVENTIONS:  - Review techniques for milk expression (breast pumping).   - Provide pumping equipment/supplies, instructions, and assistance until it is safe to breastfeed infant.  Outcome: Progressing  Goal: Appropriate maternal -  bonding  Description: INTERVENTIONS:  - Assess caregiver's emotional  status and coping mechanisms.  - Encourage caregiver to participate in  daily care.  - Assess support systems available to mother/family.  - Provide /case management support as needed.  Outcome: Progressing

## 2025-01-03 NOTE — L&D DELIVERY NOTE
Impression: Vitreous degeneration, left eye: H43.812. Plan: Posterior vitreous detachment accounts for the patient's complaints. There is no evidence of retinal pathology. All signs and risks of retinal detachment and tears were discussed in detail. Patient instructed to call the office immediately if any symptoms noted. Recommend the patient return to office for follow up. Leonel Baltazar [[id number]]      Labor Events     labor?: No   steroids?: None  Antibiotics received during labor?: Yes  Antibiotics (enter # doses in comment): clindamycin, cefazolin  Rupture date/time: 20256     Rupture type: AROM  Fluid color: Meconium  Labor type: Induced Onset of Labor  Induction: Misoprostol, Oxytocin, AROM  Indications for induction: Gestational Hypertension  Intrapartum & labor complications: Arrest of dilatation, Gestational hypertension, Meconium       Labor Length    3rd stage: 0h 00m       Labor Event Times    Labor onset date/time: 2025 0000  Decision date/time (emergent ): 2025 1530       [place] Presentation    Presentation: Vertex       Operative Delivery    Operative Vaginal Delivery: No                Shoulder Dystocia    Shoulder Dystocia: No       Anesthesia    Method: Epidural              [place] Delivery      Delivery date/time:  25 18:05:49   Delivery type: Caesarean Section    Details:   categorization: Primary    priority: unscheduled   Indications for : Arrest of Dilatation   Skin incision type: 1 Pfannenstiel   Uterine Incision type: Low Transverse      Delivery location: OR       Delivery Providers    Delivering Clinician: Angela Zimmerman MD   Delivery personnel:  Provider Role   Carlota Huggins, RN Baby Nurse   Ratna Sanford RN Delivery Nurse   Covert, MD [place] Neonatologist             Cord    Vessels: 3 Vessels  Complications: None  Timed cord clamping: Yes  Time in sec: 30  Cord blood disposition: to lab  Gases sent?: No       Resuscitation    Method: Oxygen       [place] Measurements      Weight: 3550 g 7 lb 13.2 oz Length: 50.8 cm     Head circum.: 35 cm Chest circum.: 31.5 cm      Abdominal circum.: 34 cm           Placenta    Date/time: 2025 1806  Removal: Manual Removal  Appearance: Intact  Disposition: Pathology       Apgars    Living status: Living   Apgar  Scoring Key:    0 1 2    Skin color Blue or pale Acrocyanotic Completely pink    Heart rate Absent <100 bpm >100 bpm    Reflex irritability No response Grimace Cry or active withdrawal    Muscle tone Limp Some flexion Active motion    Respiratory effort Absent Weak cry; hypoventilation Good, crying              1 Minute:  5 Minute:  10 Minute:  15 Minute:  20 Minute:      Skin color: 0  1       Heart rate: 2  2       Reflex irritablity: 1  2       Muscle tone: 2  2       Respiratory effort: 2  2       Total: 7  9          Apgars assigned by: COVERT  [place] disposition: NICU       Skin to Skin    Reason skin to skin not initiated:  Acuity       Vaginal Count       Sponges   Sharps    Initial counts    0    Final counts               Lacerations    Episiotomy: None  Perineal lacerations: None      Vaginal laceration?: No      Cervical laceration?: No    Clitoral laceration?: No    Estimated blood loss (mL): 800

## 2025-01-03 NOTE — PROGRESS NOTES
[facility]  Post-Partum Caesarean Section Progress Note    Lilli Baltazar Patient Status:  Inpatient    1990 MRN [id number]   Location [facility] 1SW-J Attending Lo Montoya MD   Hosp Day # 3 PCP No primary care provider on file.     SUBJECTIVE:    Postpartum Day 1:  Delivery    The patient feels well. The patient denies emotional concerns. Pain is well controlled with current medications. Lochia is minimal. Urinary output is adequate.  The patient is tolerating a  diet. Flatus has been passed.    OBJECTIVE:    Vital signs in last 24 hours:  Temp:  [97.1 °F (36.2 °C)-99.8 °F (37.7 °C)] 97.6 °F (36.4 °C)  Pulse:  [] 86  Resp:  [13-21] 17  BP: (119-166)/(63-93) 132/79  SpO2:  [93 %-99 %] 98 %  Input/Output:  [unfilled]    General:    alert, appears stated age, and cooperative   Bowel Sounds:  active   Uterine Fundus:   firm below the umbilicus   Incision:  healing well, no significant drainage, no dehiscence, no significant erythema, well approximated with sutures.     DVT Evaluation:  No evidence of DVT seen on physical exam.     Data Reviewed:    Lab Results   Component Value Date    WBC 15.3 2025    HGB 9.8 2025    HCT 30.6 2025    .0 2025    CREATSERUM 0.77 2025    BUN 5 2025     2025    K 3.1 2025     2025    CO2 22.0 2025    GLU 80 2025    CA 8.6 2025    ALB 3.0 2025    ALKPHO 101 2025    BILT 0.3 2025    TP 5.4 2025    AST 18 2025    ALT <7 2025           ASSESSMENT:    Post Op Day 1.  Status post  Section   Gestational Hypertension    PLAN:    Doing well postoperatively.   Continue current care.    Lo Montoya MD  1/3/2025  11:18 AM

## 2025-01-04 LAB — POTASSIUM SERPL-SCNC: 3.1 MMOL/L (ref 3.5–5.1)

## 2025-01-04 RX ORDER — POTASSIUM CHLORIDE 1.5 G/1.58G
40 POWDER, FOR SOLUTION ORAL ONCE
Status: COMPLETED | OUTPATIENT
Start: 2025-01-04 | End: 2025-01-04

## 2025-01-04 RX ORDER — NIFEDIPINE 30 MG/1
30 TABLET, EXTENDED RELEASE ORAL DAILY
Status: DISCONTINUED | OUTPATIENT
Start: 2025-01-04 | End: 2025-01-05

## 2025-01-04 RX ORDER — POTASSIUM CHLORIDE 1.5 G/1.58G
40 POWDER, FOR SOLUTION ORAL 2 TIMES DAILY
Status: DISCONTINUED | OUTPATIENT
Start: 2025-01-04 | End: 2025-01-04

## 2025-01-04 RX ORDER — POTASSIUM CHLORIDE 1500 MG/1
40 TABLET, EXTENDED RELEASE ORAL ONCE
Status: DISCONTINUED | OUTPATIENT
Start: 2025-01-04 | End: 2025-01-04

## 2025-01-04 NOTE — PLAN OF CARE
Problem: POSTPARTUM  Goal: Optimize infant feeding at the breast  Description: INTERVENTIONS:  - Monitor effectiveness of current breast feeding efforts.  - Assess support systems available to mother/family.  - Identify cultural beliefs/practices regarding lactation, letdown techniques, maternal food preferences.  - Assess mother's knowledge and previous experience with breast feeding.  - Discuss/demonstrate breast feeding aids (breast pumps).  - Encourage mother/other family members to express feelings/concerns, and actively listen.  - Educate father/SO about benefits of breast feeding and how to manage common lactation challenges.  - Recommend avoidance of specific medications or substances incompatible with breast feeding.  - Assess and monitor for signs of nipple pain/trauma.  - Review techniques for milk expression (breast pumping) and storage of breast milk. Provide pumping equipment/supplies, instructions and assistance, as needed.  - Encourage skin-to-skin contact.  - Provide LC support as needed.  - Assess for and manage engorgement.  - Provide breast feeding education handouts and information on community breast feeding support.   1/3/2025 2016 by Clemencia Schumacher RN  Outcome: Progressing  1/3/2025 2016 by Clemencia Schumacher RN  Outcome: Progressing  Goal: Establishment of adequate milk supply with medication/procedure interruptions  Description: INTERVENTIONS:  - Review techniques for milk expression (breast pumping).   - Provide pumping equipment/supplies, instructions, and assistance until it is safe to breastfeed infant.  1/3/2025 2016 by Clemencia Schumacher, RN  Outcome: Progressing  1/3/2025 2016 by Clemencia Schumacher, RN  Outcome: Progressing  Goal: Appropriate maternal -  bonding  Description: INTERVENTIONS:  - Assess caregiver's emotional status and coping mechanisms.  - Encourage caregiver to participate in  daily care.  - Assess support systems available to mother/family.  - Provide  /case management support as needed.  1/3/2025 2016 by Clemencia Schumcaher, RN  Outcome: Progressing  1/3/2025 2016 by Clemencia Schumacher, RN  Outcome: Progressing

## 2025-01-04 NOTE — PROGRESS NOTES
[facility]  Post-Partum Caesarean Section Progress Note    Lilli Baltazar Patient Status:  Inpatient    1990 MRN [id number]   Location [facility] 1SW-J Attending Lo Montoya MD   Hosp Day # 4 PCP No primary care provider on file.     SUBJECTIVE:    Postpartum Day 2:  Delivery    The patient feels well. The patient denies emotional concerns. Pain is well controlled with current medications. Lochia is minimal. Urinary output is adequate.  The patient is tolerating a  diet. Flatus has been passed.    OBJECTIVE:    Vital signs in last 24 hours:  Temp:  [97.7 °F (36.5 °C)-97.8 °F (36.6 °C)] 97.7 °F (36.5 °C)  Pulse:  [] 82  Resp:  [17-18] 18  BP: (127-142)/(79-91) 142/91  SpO2:  [98 %-100 %] 100 %  Input/Output:  [unfilled]    General:    alert, appears stated age, and cooperative   Bowel Sounds:  active   Uterine Fundus:   firm below the umbilicus   Incision:  healing well, no significant drainage, no dehiscence, no significant erythema, well approximated with sutures.     DVT Evaluation:  No evidence of DVT seen on physical exam.     Data Reviewed:    Lab Results   Component Value Date    K 3.1 2025           ASSESSMENT:    Post Op Day 2.  Status post  Section   Gestational HTN-Borderline BP Will begin Procardia    PLAN:    Doing well postoperatively.   Procardia 30XL to be initiaited  K= protocol  Continue current care.    Lo Montoya MD  2025  10:38 AM

## 2025-01-04 NOTE — PLAN OF CARE
Problem: POSTPARTUM  Goal: Optimize infant feeding at the breast  Description: INTERVENTIONS:  - Monitor effectiveness of current breast feeding efforts.  - Assess support systems available to mother/family.  - Identify cultural beliefs/practices regarding lactation, letdown techniques, maternal food preferences.  - Assess mother's knowledge and previous experience with breast feeding.  - Discuss/demonstrate breast feeding aids (breast pumps).  - Encourage mother/other family members to express feelings/concerns, and actively listen.  - Educate father/SO about benefits of breast feeding and how to manage common lactation challenges.  - Recommend avoidance of specific medications or substances incompatible with breast feeding.  - Assess and monitor for signs of nipple pain/trauma.  - Review techniques for milk expression (breast pumping) and storage of breast milk. Provide pumping equipment/supplies, instructions and assistance, as needed.  - Encourage skin-to-skin contact.  - Provide LC support as needed.  - Assess for and manage engorgement.  - Provide breast feeding education handouts and information on community breast feeding support.   Outcome: Progressing  Goal: Establishment of adequate milk supply with medication/procedure interruptions  Description: INTERVENTIONS:  - Review techniques for milk expression (breast pumping).   - Provide pumping equipment/supplies, instructions, and assistance until it is safe to breastfeed infant.  Outcome: Progressing  Goal: Appropriate maternal -  bonding  Description: INTERVENTIONS:  - Assess caregiver's emotional status and coping mechanisms.  - Encourage caregiver to participate in  daily care.  - Assess support systems available to mother/family.  - Provide /case management support as needed.  Outcome: Progressing

## 2025-01-05 VITALS
HEART RATE: 86 BPM | OXYGEN SATURATION: 99 % | TEMPERATURE: 98 F | WEIGHT: 179 LBS | RESPIRATION RATE: 16 BRPM | DIASTOLIC BLOOD PRESSURE: 78 MMHG | BODY MASS INDEX: 35.14 KG/M2 | SYSTOLIC BLOOD PRESSURE: 124 MMHG | HEIGHT: 60 IN

## 2025-01-05 PROBLEM — Z34.90 PREGNANCY (HCC): Status: RESOLVED | Noted: 2024-12-31 | Resolved: 2025-01-05

## 2025-01-05 LAB — POTASSIUM SERPL-SCNC: 3.2 MMOL/L (ref 3.5–5.1)

## 2025-01-05 RX ORDER — POTASSIUM CHLORIDE 1.5 G/1.58G
40 POWDER, FOR SOLUTION ORAL ONCE
Status: COMPLETED | OUTPATIENT
Start: 2025-01-05 | End: 2025-01-05

## 2025-01-05 RX ORDER — NIFEDIPINE 30 MG
30 TABLET, EXTENDED RELEASE ORAL DAILY
Qty: 30 TABLET | Refills: 0 | Status: SHIPPED | OUTPATIENT
Start: 2025-01-06

## 2025-01-05 RX ORDER — ACETAMINOPHEN 500 MG
1000 TABLET ORAL EVERY 6 HOURS
Status: SHIPPED | COMMUNITY
Start: 2025-01-05

## 2025-01-05 RX ORDER — GABAPENTIN 300 MG/1
300 CAPSULE ORAL EVERY 8 HOURS SCHEDULED
Qty: 15 CAPSULE | Refills: 0 | Status: SHIPPED | OUTPATIENT
Start: 2025-01-05 | End: 2025-01-10

## 2025-01-05 RX ORDER — IBUPROFEN 600 MG/1
600 TABLET, FILM COATED ORAL EVERY 6 HOURS
Qty: 30 TABLET | Refills: 0 | Status: SHIPPED | OUTPATIENT
Start: 2025-01-05

## 2025-01-05 NOTE — PROGRESS NOTES
Discharge teaching complete, but patient wants to stay for another few hours to visit baby in NICU.

## 2025-01-05 NOTE — PROGRESS NOTES
Discharge instructions discussed and all questions answered. Pt feels comfortable caring for self. Baby to remain in NICU at this time.

## 2025-01-05 NOTE — PROGRESS NOTES
[facility]  Post-Partum Caesarean Section Progress Note    Lilli Baltazar Patient Status:  Inpatient    1990 MRN [id number]   Location [facility] 1SW-J Attending Lo Montoya MD   Hosp Day # 5 PCP No primary care provider on file.     SUBJECTIVE:    Postpartum Day 3:  Delivery    The patient feels well. The patient denies emotional concerns. Pain is well controlled with current medications. Lochia is minimal. Urinary output is adequate.  The patient is tolerating a  diet. Flatus has been passed.    OBJECTIVE:    Vital signs in last 24 hours:  Temp:  [97.9 °F (36.6 °C)-98.1 °F (36.7 °C)] 98 °F (36.7 °C)  Pulse:  [72-87] 86  Resp:  [16-18] 16  BP: (124-141)/(78-97) 124/78  SpO2:  [99 %] 99 %  Input/Output:  [unfilled]    General:    alert, appears stated age, and cooperative   Bowel Sounds:  active   Uterine Fundus:   firm below the umbilicus   Incision:  healing well, no significant drainage, no dehiscence, no significant erythema, well approximated with sutures.     DVT Evaluation:  No evidence of DVT seen on physical exam.     Data Reviewed:    Lab Results   Component Value Date    K 3.2 2025           ASSESSMENT:    Post Op Day 3.  Status post  Section   Gestational HTN on Procardia  Hypokalemia    PLAN:    Doing well postoperatively.   Continue current care.  K+ 40 meq PO now  Discharge instructions given.  Home BP monitoring.  Home today   Call prn.    Lo Montoya MD  2025  11:27 AM

## 2025-01-05 NOTE — PLAN OF CARE
Problem: POSTPARTUM  Goal: Optimize infant feeding at the breast  Description: INTERVENTIONS:  - Monitor effectiveness of current breast feeding efforts.  - Assess support systems available to mother/family.  - Identify cultural beliefs/practices regarding lactation, letdown techniques, maternal food preferences.  - Assess mother's knowledge and previous experience with breast feeding.  - Discuss/demonstrate breast feeding aids (breast pumps).  - Encourage mother/other family members to express feelings/concerns, and actively listen.  - Educate father/SO about benefits of breast feeding and how to manage common lactation challenges.  - Recommend avoidance of specific medications or substances incompatible with breast feeding.  - Assess and monitor for signs of nipple pain/trauma.  - Review techniques for milk expression (breast pumping) and storage of breast milk. Provide pumping equipment/supplies, instructions and assistance, as needed.  - Encourage skin-to-skin contact.  - Provide LC support as needed.  - Assess for and manage engorgement.  - Provide breast feeding education handouts and information on community breast feeding support.   Outcome: Completed  Goal: Establishment of adequate milk supply with medication/procedure interruptions  Description: INTERVENTIONS:  - Review techniques for milk expression (breast pumping).   - Provide pumping equipment/supplies, instructions, and assistance until it is safe to breastfeed infant.  Outcome: Completed  Goal: Appropriate maternal -  bonding  Description: INTERVENTIONS:  - Assess caregiver's emotional status and coping mechanisms.  - Encourage caregiver to participate in  daily care.  - Assess support systems available to mother/family.  - Provide /case management support as needed.  Outcome: Completed

## 2025-01-06 NOTE — DISCHARGE SUMMARY
[facility]    PATIENT'S NAME: AUGUSTINE SOTO   ATTENDING PHYSICIAN: Lo Montoya M.D.   PATIENT ACCOUNT#:   [id number]    LOCATION:  [address] [place] [address]  MEDICAL RECORD #:   [id number]       YOB: 1990  ADMISSION DATE:       2024      DISCHARGE DATE:  2025    DISCHARGE SUMMARY    ADMITTING DIAGNOSES:    1.   Intrauterine pregnancy 38-5/7 weeks.  2.   Gestational hypertension.  3.   Anxiety, on Lexapro.  4.   Increased body mass index.  5.   History of anemia, status post intravenous iron.    6.   Positive beta strep.    DISCHARGE DIAGNOSES:    1.   Intrauterine pregnancy 39 weeks, failure to progress.  2.   Meconium-stained amniotic fluid.    3.   Gestational hypertension.    4.   Anxiety, on Lexapro.    5.   Increased body mass index.    6.   History of anemia, status post intravenous iron.  7.   Positive beta strep.  8.   Chorioamnionitis.      PROCEDURE:  Primary low transverse  section.    HISTORY AND HOSPITAL COURSE:  The patient is a 34-year-old female,  3, para 0-0-2-0, EDC 2025, admitted at 38-5/7 weeks' gestation for induction of labor secondary to gestational hypertension.  Good fetal movement.  No complaints on admission.  The patient's cervix on admission was 30%, fingertip, -3 station.  The patient was started on Cytotec and observed overnight.  She was then started on Pitocin.  She had artificial rupture of membranes on 2025 at 2136.  She did develop chorioamnionitis and cefazolin was added to her clindamycin beta strep prophylaxis.  The patient was continued on Pitocin and she made slow progress in labor, but progressed to 6 cm, 80%, 0 station.  Caput was noted.  She remained there with Pitocin 26 mU with inadequate contractions for greater than 6 hours.  The decision was to proceed with  section.  The patient had a primary low transverse  section.  She had a viable male infant.  Birth weight was 7 pounds 13 ounces, 3550  shari.  Apgars 7 and 9.  Infant did have some respiratory distress after delivery, so was taken to the NICU for further evaluation and observation.  The patient did well postoperatively.  She did have some mildly elevated blood pressures postoperatively and was started on Procardia 30 mg XL 1 tab p.o. q.a.m.  She was also noted to have a slightly low potassium and was treated with oral potassium.  The patient is urinating, ambulating, tolerating a regular diet.  Her pain is relieved with gabapentin, ibuprofen, and Tylenol.  The patient is being discharged home on postop day #3 per her request.  She will do home blood pressure monitoring and call with blood pressures greater than 160/100.  She will follow up in the office in approximately 3 days or call as needed.      Dictated By Lo Montoya M.D.  d: 01/05/2025 11:53:43  t: 01/06/2025 04:49:49  Job [id number]  [facility]/

## 2025-01-07 ENCOUNTER — TELEPHONE (OUTPATIENT)
Dept: OBGYN UNIT | Facility: HOSPITAL | Age: 35
End: 2025-01-07

## 2025-01-07 PROBLEM — Z3A.39 39 WEEKS GESTATION OF PREGNANCY (HCC): Status: ACTIVE | Noted: 2025-01-07

## 2025-01-08 ENCOUNTER — TELEPHONE (OUTPATIENT)
Dept: OBGYN UNIT | Facility: HOSPITAL | Age: 35
End: 2025-01-08

## 2025-04-14 ENCOUNTER — HOSPITAL ENCOUNTER (OUTPATIENT)
Age: 35
Discharge: HOME | End: 2025-04-14
Payer: COMMERCIAL

## 2025-04-14 VITALS
DIASTOLIC BLOOD PRESSURE: 86 MMHG | OXYGEN SATURATION: 98 % | WEIGHT: 148 LBS | SYSTOLIC BLOOD PRESSURE: 130 MMHG | TEMPERATURE: 98 F | HEART RATE: 76 BPM | RESPIRATION RATE: 16 BRPM | BODY MASS INDEX: 29 KG/M2

## 2025-04-14 DIAGNOSIS — L24.A9 IRRITANT CONTACT DERMATITIS DUE TO OTHER BODY FLUID: Primary | ICD-10-CM

## 2025-04-14 DIAGNOSIS — R21 RASH: ICD-10-CM

## 2025-04-14 PROCEDURE — 99204 OFFICE O/P NEW MOD 45 MIN: CPT

## 2025-04-14 PROCEDURE — 99213 OFFICE O/P EST LOW 20 MIN: CPT

## 2025-04-14 RX ORDER — CEPHALEXIN 500 MG/1
500 CAPSULE ORAL 4 TIMES DAILY
Qty: 40 CAPSULE | Refills: 0 | Status: SHIPPED | OUTPATIENT
Start: 2025-04-14 | End: 2025-04-24

## 2025-04-14 RX ORDER — BETAMETHASONE DIPROPIONATE 0.5 MG/G
1 CREAM TOPICAL 2 TIMES DAILY
Qty: 15 G | Refills: 0 | Status: SHIPPED | OUTPATIENT
Start: 2025-04-14 | End: 2025-04-24

## 2025-04-14 NOTE — ED INITIAL ASSESSMENT (HPI)
Pt presents with reddened dry itchy patch to scalp just above back of neck, itchy rash appearing different to bilat breast worsening over past 2 weeks, pt denies new foods or products

## 2025-04-14 NOTE — ED PROVIDER NOTES
Patient Seen in: [facility]      History     Chief Complaint   Patient presents with    Laceration/Abrasion     Rash on neck and breasts - Entered by patient    Rash Skin Problem     Stated Complaint: Laceration/Abrasion - Rash on neck and breasts    Subjective:   HPI  34-year-old who is breast-feeding who states that during the night her child is no longer feeding and she will wake up with her bra and shirt wet with breastmilk presents complaining of an itchy rash to her bilateral breasts for weeks.  She also has a rash to the base of her neck which possibly started at a different time which is scaly.  She denies any fevers.  History of Present Illness               Objective:     Past Medical History:    Anxiety state    Endometrial polyp    History of smoking    stopped in  for pregnancy    Miscarriage (HCC)    PCOS (polycystic ovarian syndrome)    PONV (postoperative nausea and vomiting)    Pregnancy-induced hypertension (HCC)              Past Surgical History:   Procedure Laterality Date    Hysteroscopy  2023    UTERINE POLYP REMOVED      Other surgical history      ELECTIVE  -[employer]    Other surgical history      WISDOM TEETH  SURGERY                Social History     Socioeconomic History    Marital status:    Tobacco Use    Smoking status: Never    Smokeless tobacco: Never   Vaping Use    Vaping status: Former   Substance and Sexual Activity    Alcohol use: Not Currently    Drug use: Never     Social Drivers of Health     Food Insecurity: No Food Insecurity (2024)    Food Insecurity     Food Insecurity: Never true   Transportation Needs: No Transportation Needs (2024)    Transportation Needs     Lack of Transportation: No     Car Seat: Yes   Stress: No Stress Concern Present (2024)    Stress     Feeling of Stress : No   Housing Stability: Low Risk  (2024)    Housing Stability     Housing Instability: No     Crib or Bassinette: Yes               Review of Systems   All other systems reviewed and are negative.      Positive for stated complaint: Laceration/Abrasion - Rash on neck and breasts  Other systems are as noted in HPI.  Constitutional and vital signs reviewed.      All other systems reviewed and negative except as noted above.                  Physical Exam     ED Triage Vitals [04/14/25 1708]   /86   Pulse 76   Resp 16   Temp 98 °F (36.7 °C)   Temp src Oral   SpO2 98 %   O2 Device None (Room air)       Current Vitals:   Vital Signs  BP: 130/86  Pulse: 76  Resp: 16  Temp: 98 °F (36.7 °C)  Temp src: Oral    Oxygen Therapy  SpO2: 98 %  O2 Device: None (Room air)        Physical Exam  Vitals and nursing note reviewed. Exam conducted with a chaperone present (Jacquelin HDEZ).   Constitutional:       General: She is not in acute distress.     Appearance: She is well-developed. She is not ill-appearing or toxic-appearing.   Cardiovascular:      Rate and Rhythm: Normal rate.   Pulmonary:      Effort: Pulmonary effort is normal.   Skin:     General: Skin is warm and dry.      Findings: Rash (Erythematous raised scaly rash to the base of the neck with some excoriation and erythema with warmth.  Bilateral breasts with papular rash with no petechiae or purpura) present.   Neurological:      Mental Status: She is alert and oriented to person, place, and time.           Physical Exam                ED Course   Labs Reviewed - No data to display       Results                                 MDM     Medical Decision Making  34-year-old who is breast-feeding who states that during the night her child is no longer feeding and she will wake up with her bra and shirt wet with breastmilk presents complaining of an itchy rash to her bilateral breasts for weeks.  She also has a rash to the base of her neck which possibly started at a different time which is scaly.  She denies any fevers.    Pertinent Labs & Imaging studies reviewed. (See chart for  details).  Patient coming in with rash.   Differential diagnosis includes but not limited to rash, contact dermatitis, cellulitis  Will treat for irritant contact dermatitis, rash.  Will discharge on Keflex and topical betamethasone with follow-up with dermatology. Patient/Parent is comfortable with this plan.    Rash on the breast appears to be a contact rash due to the breastmilk sitting and like a heat rash.  She will be given topical betamethasone for this.  The rash at the base of her neck appears to be a plaque-like rash that is either an eczema or psoriatic type rash which will be covered with Keflex as well as the topical betamethasone due to the excoriation and the redness and warmth.  I also provided her with dermatology.        Problems Addressed:  Irritant contact dermatitis due to other body fluid: acute illness or injury  Rash: acute illness or injury        Disposition and Plan     Clinical Impression:  1. Irritant contact dermatitis due to other body fluid    2. Rash         Disposition:  Discharge  4/14/2025  5:46 pm    Follow-up:  [address]  [facility] [id number] [phone]  Call             Medications Prescribed:  Discharge Medication List as of 4/14/2025  6:04 PM        START taking these medications    Details   cephALEXin 500 MG Oral Cap Take 1 capsule (500 mg total) by mouth 4 (four) times daily for 10 days., Normal, Disp-40 capsule, R-0      betamethasone dipropionate 0.05 % External Cream Apply 1 Application topically 2 (two) times daily for 10 days., Normal, Disp-15 g, R-0             Supplementary Documentation:

## 2025-04-25 NOTE — PLAN OF CARE
Problem: POSTPARTUM  Goal: Optimize infant feeding at the breast  Description: INTERVENTIONS:  - Monitor effectiveness of current breast feeding efforts.  - Assess support systems available to mother/family.  - Identify cultural beliefs/practices regarding lactation, letdown techniques, maternal food preferences.  - Assess mother's knowledge and previous experience with breast feeding.  - Discuss/demonstrate breast feeding aids (breast pumps).  - Encourage mother/other family members to express feelings/concerns, and actively listen.  - Educate father/SO about benefits of breast feeding and how to manage common lactation challenges.  - Recommend avoidance of specific medications or substances incompatible with breast feeding.  - Assess and monitor for signs of nipple pain/trauma.  - Review techniques for milk expression (breast pumping) and storage of breast milk. Provide pumping equipment/supplies, instructions and assistance, as needed.  - Encourage skin-to-skin contact.  - Provide LC support as needed.  - Assess for and manage engorgement.  - Provide breast feeding education handouts and information on community breast feeding support.   Outcome: Progressing  Goal: Establishment of adequate milk supply with medication/procedure interruptions  Description: INTERVENTIONS:  - Review techniques for milk expression (breast pumping).   - Provide pumping equipment/supplies, instructions, and assistance until it is safe to breastfeed infant.  Outcome: Progressing  Goal: Appropriate maternal -  bonding  Description: INTERVENTIONS:  - Assess caregiver's emotional status and coping mechanisms.  - Encourage caregiver to participate in  daily care.  - Assess support systems available to mother/family.  - Provide /case management support as needed.  Outcome: Progressing      Patient called stating that the nasal spray has helped his symptoms as well as the antibiotic.  He finished the antibiotic but wants to know when he should stop using the nasal spray.    Please call  him back

## (undated) DEVICE — SET COLL TBNG 3/8INX6FT W/ INTEGR SWVL

## (undated) DEVICE — SOLUTION IRRIG 1000ML 0.9% NACL USP BTL

## (undated) DEVICE — PREMIUM WET SKIN PREP TRAY: Brand: MEDLINE INDUSTRIES, INC.

## (undated) DEVICE — SLEEVE COMPR MD KNEE LEN SGL USE KENDALL SCD

## (undated) DEVICE — GYN CDS: Brand: MEDLINE INDUSTRIES, INC.

## (undated) DEVICE — CURETTE VAC 7MM CRV VACURET

## (undated) DEVICE — GLOVE SUR 7 SENSICARE PI PIP CRM PWD F

## (undated) DEVICE — GLOVE SUR 6.5 SENSICARE PI PIP CRM PWD F

## (undated) DEVICE — SYSTEM COLL W/ TISS TRAP INCLUDE COLL CANSTR